# Patient Record
Sex: FEMALE | Race: WHITE | NOT HISPANIC OR LATINO
[De-identification: names, ages, dates, MRNs, and addresses within clinical notes are randomized per-mention and may not be internally consistent; named-entity substitution may affect disease eponyms.]

---

## 2017-04-14 ENCOUNTER — APPOINTMENT (OUTPATIENT)
Dept: OBGYN | Facility: CLINIC | Age: 57
End: 2017-04-14

## 2017-04-14 VITALS — DIASTOLIC BLOOD PRESSURE: 85 MMHG | SYSTOLIC BLOOD PRESSURE: 141 MMHG

## 2017-04-14 VITALS — HEIGHT: 64 IN | BODY MASS INDEX: 33.46 KG/M2 | WEIGHT: 196 LBS

## 2017-04-14 DIAGNOSIS — Z12.11 ENCOUNTER FOR SCREENING FOR MALIGNANT NEOPLASM OF COLON: ICD-10-CM

## 2017-04-14 DIAGNOSIS — Z12.12 ENCOUNTER FOR SCREENING FOR MALIGNANT NEOPLASM OF COLON: ICD-10-CM

## 2017-04-16 ENCOUNTER — MESSAGE (OUTPATIENT)
Age: 57
End: 2017-04-16

## 2017-04-16 LAB — HPV HIGH+LOW RISK DNA PNL CVX: NEGATIVE

## 2017-04-18 LAB — CYTOLOGY CVX/VAG DOC THIN PREP: NORMAL

## 2017-05-13 ENCOUNTER — LABORATORY RESULT (OUTPATIENT)
Age: 57
End: 2017-05-13

## 2017-05-19 ENCOUNTER — APPOINTMENT (OUTPATIENT)
Dept: ENDOCRINOLOGY | Facility: CLINIC | Age: 57
End: 2017-05-19

## 2017-05-19 VITALS
HEIGHT: 64 IN | SYSTOLIC BLOOD PRESSURE: 140 MMHG | BODY MASS INDEX: 33.97 KG/M2 | DIASTOLIC BLOOD PRESSURE: 92 MMHG | HEART RATE: 77 BPM | WEIGHT: 199 LBS | OXYGEN SATURATION: 98 %

## 2017-12-18 ENCOUNTER — APPOINTMENT (OUTPATIENT)
Dept: ENDOCRINOLOGY | Facility: CLINIC | Age: 57
End: 2017-12-18
Payer: COMMERCIAL

## 2017-12-18 VITALS — OXYGEN SATURATION: 97 % | DIASTOLIC BLOOD PRESSURE: 78 MMHG | SYSTOLIC BLOOD PRESSURE: 122 MMHG | HEART RATE: 82 BPM

## 2017-12-18 PROCEDURE — 99214 OFFICE O/P EST MOD 30 MIN: CPT

## 2017-12-19 LAB — PROLACTIN SERPL-MCNC: 114 NG/ML

## 2018-05-25 ENCOUNTER — APPOINTMENT (OUTPATIENT)
Dept: OBGYN | Facility: CLINIC | Age: 58
End: 2018-05-25
Payer: COMMERCIAL

## 2018-05-25 VITALS — DIASTOLIC BLOOD PRESSURE: 93 MMHG | SYSTOLIC BLOOD PRESSURE: 159 MMHG

## 2018-05-25 VITALS
DIASTOLIC BLOOD PRESSURE: 90 MMHG | BODY MASS INDEX: 38.24 KG/M2 | SYSTOLIC BLOOD PRESSURE: 162 MMHG | HEIGHT: 64 IN | WEIGHT: 224 LBS

## 2018-05-25 DIAGNOSIS — Z82.0 FAMILY HISTORY OF EPILEPSY AND OTHER DISEASES OF THE NERVOUS SYSTEM: ICD-10-CM

## 2018-05-25 PROCEDURE — 99396 PREV VISIT EST AGE 40-64: CPT

## 2018-05-25 RX ORDER — AZITHROMYCIN 250 MG/1
250 TABLET, FILM COATED ORAL
Qty: 6 | Refills: 0 | Status: COMPLETED | COMMUNITY
Start: 2017-12-22

## 2018-05-29 ENCOUNTER — APPOINTMENT (OUTPATIENT)
Dept: OBGYN | Facility: CLINIC | Age: 58
End: 2018-05-29

## 2018-05-29 LAB — HPV HIGH+LOW RISK DNA PNL CVX: NOT DETECTED

## 2018-06-01 LAB — CYTOLOGY CVX/VAG DOC THIN PREP: NORMAL

## 2018-06-05 ENCOUNTER — ASOB RESULT (OUTPATIENT)
Age: 58
End: 2018-06-05

## 2018-06-05 ENCOUNTER — APPOINTMENT (OUTPATIENT)
Dept: OBGYN | Facility: CLINIC | Age: 58
End: 2018-06-05
Payer: COMMERCIAL

## 2018-06-05 PROCEDURE — 76830 TRANSVAGINAL US NON-OB: CPT

## 2018-06-20 ENCOUNTER — RESULT REVIEW (OUTPATIENT)
Age: 58
End: 2018-06-20

## 2018-06-21 ENCOUNTER — OTHER (OUTPATIENT)
Age: 58
End: 2018-06-21

## 2018-08-03 ENCOUNTER — OUTPATIENT (OUTPATIENT)
Dept: OUTPATIENT SERVICES | Facility: HOSPITAL | Age: 58
LOS: 1 days | End: 2018-08-03
Payer: COMMERCIAL

## 2018-08-03 VITALS
TEMPERATURE: 97 F | DIASTOLIC BLOOD PRESSURE: 90 MMHG | RESPIRATION RATE: 16 BRPM | HEART RATE: 79 BPM | WEIGHT: 229.94 LBS | HEIGHT: 63 IN | SYSTOLIC BLOOD PRESSURE: 140 MMHG

## 2018-08-03 DIAGNOSIS — N94.9 UNSPECIFIED CONDITION ASSOCIATED WITH FEMALE GENITAL ORGANS AND MENSTRUAL CYCLE: ICD-10-CM

## 2018-08-03 DIAGNOSIS — R93.8 ABNORMAL FINDINGS ON DIAGNOSTIC IMAGING OF OTHER SPECIFIED BODY STRUCTURES: ICD-10-CM

## 2018-08-03 DIAGNOSIS — Z90.721 ACQUIRED ABSENCE OF OVARIES, UNILATERAL: Chronic | ICD-10-CM

## 2018-08-03 LAB
BLD GP AB SCN SERPL QL: NEGATIVE — SIGNIFICANT CHANGE UP
BUN SERPL-MCNC: 12 MG/DL — SIGNIFICANT CHANGE UP (ref 7–23)
CALCIUM SERPL-MCNC: 10 MG/DL — SIGNIFICANT CHANGE UP (ref 8.4–10.5)
CHLORIDE SERPL-SCNC: 103 MMOL/L — SIGNIFICANT CHANGE UP (ref 98–107)
CO2 SERPL-SCNC: 25 MMOL/L — SIGNIFICANT CHANGE UP (ref 22–31)
CREAT SERPL-MCNC: 0.89 MG/DL — SIGNIFICANT CHANGE UP (ref 0.5–1.3)
GLUCOSE SERPL-MCNC: 91 MG/DL — SIGNIFICANT CHANGE UP (ref 70–99)
HCT VFR BLD CALC: 40.2 % — SIGNIFICANT CHANGE UP (ref 34.5–45)
HGB BLD-MCNC: 12.5 G/DL — SIGNIFICANT CHANGE UP (ref 11.5–15.5)
MCHC RBC-ENTMCNC: 26.4 PG — LOW (ref 27–34)
MCHC RBC-ENTMCNC: 31.1 % — LOW (ref 32–36)
MCV RBC AUTO: 84.8 FL — SIGNIFICANT CHANGE UP (ref 80–100)
NRBC # FLD: 0 — SIGNIFICANT CHANGE UP
PLATELET # BLD AUTO: 328 K/UL — SIGNIFICANT CHANGE UP (ref 150–400)
PMV BLD: 10.9 FL — SIGNIFICANT CHANGE UP (ref 7–13)
POTASSIUM SERPL-MCNC: 4 MMOL/L — SIGNIFICANT CHANGE UP (ref 3.5–5.3)
POTASSIUM SERPL-SCNC: 4 MMOL/L — SIGNIFICANT CHANGE UP (ref 3.5–5.3)
RBC # BLD: 4.74 M/UL — SIGNIFICANT CHANGE UP (ref 3.8–5.2)
RBC # FLD: 13.9 % — SIGNIFICANT CHANGE UP (ref 10.3–14.5)
RH IG SCN BLD-IMP: POSITIVE — SIGNIFICANT CHANGE UP
SODIUM SERPL-SCNC: 140 MMOL/L — SIGNIFICANT CHANGE UP (ref 135–145)
WBC # BLD: 9.31 K/UL — SIGNIFICANT CHANGE UP (ref 3.8–10.5)
WBC # FLD AUTO: 9.31 K/UL — SIGNIFICANT CHANGE UP (ref 3.8–10.5)

## 2018-08-03 PROCEDURE — 93010 ELECTROCARDIOGRAM REPORT: CPT

## 2018-08-03 NOTE — H&P PST ADULT - VISION (WITH CORRECTIVE LENSES IF THE PATIENT USUALLY WEARS THEM):
Partially impaired: cannot see medication labels or newsprint, but can see obstacles in path, and the surrounding layout; can count fingers at arm's length/glasses for reading only

## 2018-08-03 NOTE — H&P PST ADULT - PMH
GERD (gastroesophageal reflux disease)    Obesity    Osteoporosis    Ovarian cyst, left    Pituitary tumor    Polyp of corpus uteri GERD (gastroesophageal reflux disease)    Morbidly obese    Osteoporosis    Ovarian cyst, left    Pituitary tumor    Polyp of corpus uteri

## 2018-08-03 NOTE — H&P PST ADULT - NSANTHOSAYNRD_GEN_A_CORE
No. DAREN screening performed.  STOP BANG Legend: 0-2 = LOW Risk; 3-4 = INTERMEDIATE Risk; 5-8 = HIGH Risk

## 2018-08-13 ENCOUNTER — TRANSCRIPTION ENCOUNTER (OUTPATIENT)
Age: 58
End: 2018-08-13

## 2018-08-14 ENCOUNTER — RESULT REVIEW (OUTPATIENT)
Age: 58
End: 2018-08-14

## 2018-08-14 ENCOUNTER — OUTPATIENT (OUTPATIENT)
Dept: OUTPATIENT SERVICES | Facility: HOSPITAL | Age: 58
LOS: 1 days | Discharge: HOME | End: 2018-08-14
Payer: COMMERCIAL

## 2018-08-14 ENCOUNTER — APPOINTMENT (OUTPATIENT)
Dept: OBGYN | Facility: HOSPITAL | Age: 58
End: 2018-08-14

## 2018-08-14 VITALS
DIASTOLIC BLOOD PRESSURE: 91 MMHG | HEART RATE: 76 BPM | OXYGEN SATURATION: 97 % | TEMPERATURE: 98 F | RESPIRATION RATE: 16 BRPM | SYSTOLIC BLOOD PRESSURE: 151 MMHG

## 2018-08-14 VITALS
TEMPERATURE: 97 F | SYSTOLIC BLOOD PRESSURE: 140 MMHG | DIASTOLIC BLOOD PRESSURE: 90 MMHG | HEART RATE: 79 BPM | RESPIRATION RATE: 16 BRPM | WEIGHT: 229.94 LBS | HEIGHT: 63 IN

## 2018-08-14 DIAGNOSIS — Z90.721 ACQUIRED ABSENCE OF OVARIES, UNILATERAL: Chronic | ICD-10-CM

## 2018-08-14 DIAGNOSIS — R93.8 ABNORMAL FINDINGS ON DIAGNOSTIC IMAGING OF OTHER SPECIFIED BODY STRUCTURES: ICD-10-CM

## 2018-08-14 PROBLEM — M81.0 AGE-RELATED OSTEOPOROSIS WITHOUT CURRENT PATHOLOGICAL FRACTURE: Chronic | Status: ACTIVE | Noted: 2018-08-03

## 2018-08-14 PROCEDURE — 88305 TISSUE EXAM BY PATHOLOGIST: CPT | Mod: 26

## 2018-08-14 PROCEDURE — 58558 HYSTEROSCOPY BIOPSY: CPT

## 2018-08-14 RX ORDER — CHOLECALCIFEROL (VITAMIN D3) 125 MCG
1 CAPSULE ORAL
Qty: 0 | Refills: 0 | COMMUNITY

## 2018-08-14 RX ORDER — OMEPRAZOLE 10 MG/1
1 CAPSULE, DELAYED RELEASE ORAL
Qty: 0 | Refills: 0 | COMMUNITY

## 2018-08-14 RX ORDER — ONDANSETRON 8 MG/1
4 TABLET, FILM COATED ORAL EVERY 6 HOURS
Qty: 0 | Refills: 0 | Status: DISCONTINUED | OUTPATIENT
Start: 2018-08-14 | End: 2018-08-29

## 2018-08-14 RX ORDER — FENTANYL CITRATE 50 UG/ML
50 INJECTION INTRAVENOUS
Qty: 0 | Refills: 0 | Status: DISCONTINUED | OUTPATIENT
Start: 2018-08-14 | End: 2018-08-14

## 2018-08-14 RX ORDER — FENTANYL CITRATE 50 UG/ML
25 INJECTION INTRAVENOUS
Qty: 0 | Refills: 0 | Status: DISCONTINUED | OUTPATIENT
Start: 2018-08-14 | End: 2018-08-14

## 2018-08-14 RX ORDER — SODIUM CHLORIDE 9 MG/ML
1000 INJECTION, SOLUTION INTRAVENOUS
Qty: 0 | Refills: 0 | Status: DISCONTINUED | OUTPATIENT
Start: 2018-08-14 | End: 2018-08-29

## 2018-08-14 NOTE — BRIEF OPERATIVE NOTE - PROCEDURE
<<-----Click on this checkbox to enter Procedure Hysteroscopy with dilation and curettage of uterus  08/14/2018    Active  JUNL

## 2018-08-14 NOTE — ASU DISCHARGE PLAN (ADULT/PEDIATRIC). - ACTIVITY LEVEL
for 2 weeks/no tampons/nothing per rectum/no weight bearing/no tub baths/no douching/no intercourse/nothing per vagina/no heavy lifting

## 2018-08-14 NOTE — ASU PATIENT PROFILE, ADULT - PMH
GERD (gastroesophageal reflux disease)    Morbidly obese    Osteoporosis    Ovarian cyst, left    Pituitary tumor    Polyp of corpus uteri

## 2018-08-14 NOTE — ASU DISCHARGE PLAN (ADULT/PEDIATRIC). - MEDICATION SUMMARY - MEDICATIONS TO TAKE
I will START or STAY ON the medications listed below when I get home from the hospital:    Tylenol 325 mg oral tablet  -- 2 tab(s) by mouth every 4 hours, As Needed  -- Indication: For pain/cramping    Motrin 600 mg oral tablet  -- 1 tab(s) by mouth every 6 hours, As Needed  -- Indication: For pain/cramping

## 2018-08-14 NOTE — BRIEF OPERATIVE NOTE - OPERATION/FINDINGS
Anterverted uterus noted on exam. Unremarkable uterine cavity on hysteroscopy. No polyps or lesions noted.

## 2018-08-14 NOTE — ASU DISCHARGE PLAN (ADULT/PEDIATRIC). - NURSING INSTRUCTIONS
Nothing in vagina, no intercourse, no douching, no tampons, no tub baths, and no swimming for 2 weeks or until cleared by M.D.

## 2018-08-22 ENCOUNTER — RESULT REVIEW (OUTPATIENT)
Age: 58
End: 2018-08-22

## 2018-09-06 ENCOUNTER — APPOINTMENT (OUTPATIENT)
Dept: OBGYN | Facility: CLINIC | Age: 58
End: 2018-09-06
Payer: COMMERCIAL

## 2018-09-06 VITALS — DIASTOLIC BLOOD PRESSURE: 97 MMHG | SYSTOLIC BLOOD PRESSURE: 167 MMHG

## 2018-09-06 PROCEDURE — 99213 OFFICE O/P EST LOW 20 MIN: CPT

## 2018-12-18 ENCOUNTER — APPOINTMENT (OUTPATIENT)
Dept: ENDOCRINOLOGY | Facility: CLINIC | Age: 58
End: 2018-12-18
Payer: COMMERCIAL

## 2018-12-18 VITALS
WEIGHT: 240 LBS | BODY MASS INDEX: 40.97 KG/M2 | HEIGHT: 64 IN | OXYGEN SATURATION: 98 % | SYSTOLIC BLOOD PRESSURE: 150 MMHG | DIASTOLIC BLOOD PRESSURE: 90 MMHG | HEART RATE: 80 BPM

## 2018-12-18 DIAGNOSIS — D35.2 BENIGN NEOPLASM OF PITUITARY GLAND: ICD-10-CM

## 2018-12-18 PROCEDURE — 99214 OFFICE O/P EST MOD 30 MIN: CPT

## 2018-12-18 RX ORDER — TOCOPHERSOLAN (VITAMIN E TPGS) 400/15ML
LIQUID (ML) ORAL
Refills: 0 | Status: ACTIVE | COMMUNITY

## 2018-12-22 ENCOUNTER — LABORATORY RESULT (OUTPATIENT)
Age: 58
End: 2018-12-22

## 2018-12-26 LAB
ALBUMIN SERPL ELPH-MCNC: 4.3 G/DL
ALP BLD-CCNC: 95 U/L
ALT SERPL-CCNC: 22 U/L
ANION GAP SERPL CALC-SCNC: 11 MMOL/L
AST SERPL-CCNC: 21 U/L
BILIRUB SERPL-MCNC: 0.5 MG/DL
BUN SERPL-MCNC: 11 MG/DL
CALCIUM SERPL-MCNC: 10 MG/DL
CHLORIDE SERPL-SCNC: 105 MMOL/L
CO2 SERPL-SCNC: 24 MMOL/L
CREAT SERPL-MCNC: 0.87 MG/DL
FSH SERPL-MCNC: 3.1 IU/L
GLUCOSE SERPL-MCNC: 103 MG/DL
IGF-1 INTERP: NORMAL
IGF-I BLD-MCNC: 165 NG/ML
LH SERPL-ACNC: 0.6 IU/L
POTASSIUM SERPL-SCNC: 4.2 MMOL/L
PROLACTIN SERPL-MCNC: 104.5 NG/ML
PROT SERPL-MCNC: 7.7 G/DL
SODIUM SERPL-SCNC: 140 MMOL/L
T3RU NFR SERPL: 1.16 INDEX
T4 SERPL-MCNC: 6.4 UG/DL
TSH SERPL-ACNC: 1.74 UIU/ML

## 2018-12-29 LAB
MONOMERIC PROLACTIN (ICMA)*: 94 NG/ML
PERCENT MACROPROLACTIN: 5 %
PROLACTIN, SERUM (ICMA)*: 99 NG/ML

## 2019-05-31 ENCOUNTER — APPOINTMENT (OUTPATIENT)
Dept: OBGYN | Facility: CLINIC | Age: 59
End: 2019-05-31
Payer: COMMERCIAL

## 2019-05-31 VITALS
BODY MASS INDEX: 38.41 KG/M2 | DIASTOLIC BLOOD PRESSURE: 86 MMHG | HEIGHT: 64 IN | WEIGHT: 225 LBS | SYSTOLIC BLOOD PRESSURE: 138 MMHG

## 2019-05-31 DIAGNOSIS — Z09 ENCOUNTER FOR FOLLOW-UP EXAMINATION AFTER COMPLETED TREATMENT FOR CONDITIONS OTHER THAN MALIGNANT NEOPLASM: ICD-10-CM

## 2019-05-31 PROCEDURE — 99396 PREV VISIT EST AGE 40-64: CPT

## 2019-05-31 NOTE — CHIEF COMPLAINT
[Annual Visit] : annual visit [FreeTextEntry1] : Pt states earlier in the week she had some cramping and htne noticed some blood when she wiped.  Is better now - started Monday.  Pt is menopausal - last period was 6-7 years ago.  Had D&C last August.

## 2019-06-06 LAB
CYTOLOGY CVX/VAG DOC THIN PREP: NORMAL
HPV HIGH+LOW RISK DNA PNL CVX: NOT DETECTED

## 2019-06-11 ENCOUNTER — ASOB RESULT (OUTPATIENT)
Age: 59
End: 2019-06-11

## 2019-06-11 ENCOUNTER — APPOINTMENT (OUTPATIENT)
Dept: OBGYN | Facility: CLINIC | Age: 59
End: 2019-06-11
Payer: COMMERCIAL

## 2019-06-11 PROCEDURE — 76830 TRANSVAGINAL US NON-OB: CPT

## 2019-07-11 ENCOUNTER — TRANSCRIPTION ENCOUNTER (OUTPATIENT)
Age: 59
End: 2019-07-11

## 2019-07-12 ENCOUNTER — APPOINTMENT (OUTPATIENT)
Dept: OBGYN | Facility: CLINIC | Age: 59
End: 2019-07-12
Payer: COMMERCIAL

## 2019-07-12 VITALS
HEIGHT: 64 IN | DIASTOLIC BLOOD PRESSURE: 84 MMHG | WEIGHT: 225 LBS | BODY MASS INDEX: 38.41 KG/M2 | SYSTOLIC BLOOD PRESSURE: 147 MMHG

## 2019-07-12 DIAGNOSIS — N95.0 POSTMENOPAUSAL BLEEDING: ICD-10-CM

## 2019-07-12 PROCEDURE — 58100 BIOPSY OF UTERUS LINING: CPT

## 2019-07-12 NOTE — PROCEDURE
[Endometrial Biopsy] : Endometrial biopsy [Post-Menop. Bleeding] : post-menopausal bleeding [CONSENT OBTAINED] : written consent was obtained prior to the procedure. [Betadine] : Betadine [Ibuprofen ___ mg] : ibuprofen [unfilled] ~Umg [None] : none [Easy Passage] : allowed easy passage of a uterine sound without dilation [Sounded to ___ cm] : sounded to [unfilled] ~Ucm [Pipelle] : a Pipelle endometrial suction curette [Moderate] : a moderate [Tolerated Well] : the patient tolerated the procedure well [No Complications] : there were no complications

## 2019-07-18 ENCOUNTER — OTHER (OUTPATIENT)
Age: 59
End: 2019-07-18

## 2019-07-19 LAB — CORE LAB BIOPSY: NORMAL

## 2019-08-02 ENCOUNTER — APPOINTMENT (OUTPATIENT)
Dept: OBGYN | Facility: CLINIC | Age: 59
End: 2019-08-02
Payer: COMMERCIAL

## 2019-08-02 VITALS
HEIGHT: 64 IN | WEIGHT: 225 LBS | SYSTOLIC BLOOD PRESSURE: 159 MMHG | BODY MASS INDEX: 38.41 KG/M2 | DIASTOLIC BLOOD PRESSURE: 100 MMHG

## 2019-08-02 PROCEDURE — 99215 OFFICE O/P EST HI 40 MIN: CPT

## 2019-08-02 NOTE — CHIEF COMPLAINT
[FreeTextEntry1] : Pt presents with her sister to discuss results.  Pt with complex hyperplasia without atypia on EMB in office done for episode of PMB.  Called pt to recommend D&C hysteroscopy for futher evaluation of the endometrium and insertion of mirena IUD and pt wanted to discuss in further detail.  Pt worried about pelvic cramping with the IUD.

## 2019-10-04 ENCOUNTER — OUTPATIENT (OUTPATIENT)
Dept: OUTPATIENT SERVICES | Facility: HOSPITAL | Age: 59
LOS: 1 days | End: 2019-10-04
Payer: COMMERCIAL

## 2019-10-04 VITALS
HEART RATE: 76 BPM | OXYGEN SATURATION: 98 % | HEIGHT: 63 IN | TEMPERATURE: 97 F | WEIGHT: 214.95 LBS | SYSTOLIC BLOOD PRESSURE: 140 MMHG | RESPIRATION RATE: 16 BRPM | DIASTOLIC BLOOD PRESSURE: 90 MMHG

## 2019-10-04 DIAGNOSIS — N85.01 BENIGN ENDOMETRIAL HYPERPLASIA: ICD-10-CM

## 2019-10-04 DIAGNOSIS — Z90.721 ACQUIRED ABSENCE OF OVARIES, UNILATERAL: Chronic | ICD-10-CM

## 2019-10-04 DIAGNOSIS — Z98.890 OTHER SPECIFIED POSTPROCEDURAL STATES: Chronic | ICD-10-CM

## 2019-10-04 LAB
ANION GAP SERPL CALC-SCNC: 12 MMO/L — SIGNIFICANT CHANGE UP (ref 7–14)
BUN SERPL-MCNC: 15 MG/DL — SIGNIFICANT CHANGE UP (ref 7–23)
CALCIUM SERPL-MCNC: 11.1 MG/DL — HIGH (ref 8.4–10.5)
CHLORIDE SERPL-SCNC: 102 MMOL/L — SIGNIFICANT CHANGE UP (ref 98–107)
CO2 SERPL-SCNC: 28 MMOL/L — SIGNIFICANT CHANGE UP (ref 22–31)
CREAT SERPL-MCNC: 0.95 MG/DL — SIGNIFICANT CHANGE UP (ref 0.5–1.3)
GLUCOSE SERPL-MCNC: 64 MG/DL — LOW (ref 70–99)
HCT VFR BLD CALC: 43.3 % — SIGNIFICANT CHANGE UP (ref 34.5–45)
HGB BLD-MCNC: 13.1 G/DL — SIGNIFICANT CHANGE UP (ref 11.5–15.5)
MCHC RBC-ENTMCNC: 25.8 PG — LOW (ref 27–34)
MCHC RBC-ENTMCNC: 30.3 % — LOW (ref 32–36)
MCV RBC AUTO: 85.4 FL — SIGNIFICANT CHANGE UP (ref 80–100)
NRBC # FLD: 0 K/UL — SIGNIFICANT CHANGE UP (ref 0–0)
PLATELET # BLD AUTO: 358 K/UL — SIGNIFICANT CHANGE UP (ref 150–400)
PMV BLD: 11 FL — SIGNIFICANT CHANGE UP (ref 7–13)
POTASSIUM SERPL-MCNC: 4.4 MMOL/L — SIGNIFICANT CHANGE UP (ref 3.5–5.3)
POTASSIUM SERPL-SCNC: 4.4 MMOL/L — SIGNIFICANT CHANGE UP (ref 3.5–5.3)
RBC # BLD: 5.07 M/UL — SIGNIFICANT CHANGE UP (ref 3.8–5.2)
RBC # FLD: 14.2 % — SIGNIFICANT CHANGE UP (ref 10.3–14.5)
SODIUM SERPL-SCNC: 142 MMOL/L — SIGNIFICANT CHANGE UP (ref 135–145)
WBC # BLD: 10.34 K/UL — SIGNIFICANT CHANGE UP (ref 3.8–10.5)
WBC # FLD AUTO: 10.34 K/UL — SIGNIFICANT CHANGE UP (ref 3.8–10.5)

## 2019-10-04 PROCEDURE — 93010 ELECTROCARDIOGRAM REPORT: CPT

## 2019-10-04 RX ORDER — SODIUM CHLORIDE 9 MG/ML
1000 INJECTION, SOLUTION INTRAVENOUS
Refills: 0 | Status: DISCONTINUED | OUTPATIENT
Start: 2019-10-21 | End: 2019-11-09

## 2019-10-04 RX ORDER — ACETAMINOPHEN 500 MG
2 TABLET ORAL
Qty: 0 | Refills: 0 | DISCHARGE

## 2019-10-04 RX ORDER — IBUPROFEN 200 MG
1 TABLET ORAL
Qty: 0 | Refills: 0 | DISCHARGE

## 2019-10-04 NOTE — H&P PST ADULT - NSICDXPROBLEM_GEN_ALL_CORE_FT
benign endometrial hyperplasia:   Scheduled for D&C operative hysteroscopy, possible symphion and Mirena IUD insertion on 10/21/19.  preop instructions given  gi prophylaxis - pt instructed to take own.  pt verbalized understanding    Hypertension:  continue medication per routine schedule  DAREN precautions recommended.  OR booking notified via fax.

## 2019-10-04 NOTE — H&P PST ADULT - PT NEEDS ASSIST
Kaitlynn stated patient admitted to Palliative Care today December 20, 2018.  Requesting PT< OT and social work.      Please call Kaitlynn   no

## 2019-10-04 NOTE — H&P PST ADULT - PRESSURE ULCER(S)
CREST 2     Left message on subject's sister's voicemail to please have subject return my call to schedule 1 month CREST 2 follow-up. I have made several attempts to contact subject and left messages.   
no

## 2019-10-04 NOTE — H&P PST ADULT - NSICDXFAMILYHX_GEN_ALL_CORE_FT
FAMILY HISTORY:  Family history of thyroid cancer, father    Mother  Still living? No  Family history of Alzheimer's disease, Age at diagnosis: Age Unknown

## 2019-10-04 NOTE — H&P PST ADULT - VISION (WITH CORRECTIVE LENSES IF THE PATIENT USUALLY WEARS THEM):
glasses for reading only/Partially impaired: cannot see medication labels or newsprint, but can see obstacles in path, and the surrounding layout; can count fingers at arm's length

## 2019-10-04 NOTE — H&P PST ADULT - NEGATIVE CARDIOVASCULAR SYMPTOMS
no paroxysmal nocturnal dyspnea/no claudication/no orthopnea/no peripheral edema/no palpitations/no chest pain/no dyspnea on exertion

## 2019-10-04 NOTE — H&P PST ADULT - NSICDXPASTMEDICALHX_GEN_ALL_CORE_FT
PAST MEDICAL HISTORY:  Benign endometrial hyperplasia     GERD (gastroesophageal reflux disease)     Hypertension     Morbidly obese     Osteoporosis     Ovarian cyst, left     Pituitary tumor     Polyp of corpus uteri PAST MEDICAL HISTORY:  Benign endometrial hyperplasia     GERD (gastroesophageal reflux disease)     Hypertension     Obesity     Osteoporosis     Ovarian cyst, left     Pituitary tumor h/o last MRI >4yrs ago    Polyp of corpus uteri

## 2019-10-04 NOTE — H&P PST ADULT - NSICDXPASTSURGICALHX_GEN_ALL_CORE_FT
PAST SURGICAL HISTORY:  History of left oophorectomy 2013 PAST SURGICAL HISTORY:  History of left oophorectomy 2013    S/P D&C (status post dilation and curettage) 8/2019

## 2019-10-04 NOTE — H&P PST ADULT - HISTORY OF PRESENT ILLNESS
Pt. is a 57 yo female that had a transvaginal sonogram that revealed endometrial hypertrophy. 60 yo female that had a transvaginal sonogram that revealed thickening of uterine lining.    Scheduled for 58 yo female that had a transvaginal sonogram that revealed thickening of uterine lining.    Scheduled for D&C operative hysteroscopy, possible symphion and Mirena IUD insertion on 10/21/19.

## 2019-10-18 NOTE — ASU PATIENT PROFILE, ADULT - PMH
Benign endometrial hyperplasia    GERD (gastroesophageal reflux disease)    Hypertension    Obesity    Osteoporosis    Ovarian cyst, left    Pituitary tumor  h/o last MRI >4yrs ago  Polyp of corpus uteri

## 2019-10-20 ENCOUNTER — TRANSCRIPTION ENCOUNTER (OUTPATIENT)
Age: 59
End: 2019-10-20

## 2019-10-21 ENCOUNTER — RESULT REVIEW (OUTPATIENT)
Age: 59
End: 2019-10-21

## 2019-10-21 ENCOUNTER — OUTPATIENT (OUTPATIENT)
Dept: OUTPATIENT SERVICES | Facility: HOSPITAL | Age: 59
LOS: 1 days | Discharge: HOME | End: 2019-10-21
Payer: COMMERCIAL

## 2019-10-21 ENCOUNTER — APPOINTMENT (OUTPATIENT)
Dept: OBGYN | Facility: HOSPITAL | Age: 59
End: 2019-10-21

## 2019-10-21 VITALS
OXYGEN SATURATION: 98 % | RESPIRATION RATE: 18 BRPM | DIASTOLIC BLOOD PRESSURE: 77 MMHG | HEART RATE: 85 BPM | SYSTOLIC BLOOD PRESSURE: 124 MMHG

## 2019-10-21 VITALS
RESPIRATION RATE: 18 BRPM | DIASTOLIC BLOOD PRESSURE: 86 MMHG | WEIGHT: 214.95 LBS | HEART RATE: 82 BPM | HEIGHT: 63 IN | SYSTOLIC BLOOD PRESSURE: 144 MMHG | OXYGEN SATURATION: 97 % | TEMPERATURE: 98 F

## 2019-10-21 DIAGNOSIS — Z90.721 ACQUIRED ABSENCE OF OVARIES, UNILATERAL: Chronic | ICD-10-CM

## 2019-10-21 DIAGNOSIS — N85.01 BENIGN ENDOMETRIAL HYPERPLASIA: ICD-10-CM

## 2019-10-21 DIAGNOSIS — Z98.890 OTHER SPECIFIED POSTPROCEDURAL STATES: Chronic | ICD-10-CM

## 2019-10-21 PROCEDURE — 58300 INSERT INTRAUTERINE DEVICE: CPT

## 2019-10-21 PROCEDURE — 58558 HYSTEROSCOPY BIOPSY: CPT

## 2019-10-21 PROCEDURE — 88305 TISSUE EXAM BY PATHOLOGIST: CPT | Mod: 26

## 2019-10-21 RX ORDER — SODIUM CHLORIDE 9 MG/ML
1000 INJECTION, SOLUTION INTRAVENOUS
Refills: 0 | Status: DISCONTINUED | OUTPATIENT
Start: 2019-10-21 | End: 2019-11-09

## 2019-10-21 RX ADMIN — SODIUM CHLORIDE 30 MILLILITER(S): 9 INJECTION, SOLUTION INTRAVENOUS at 11:05

## 2019-10-21 NOTE — ASU DISCHARGE PLAN (ADULT/PEDIATRIC) - CALL YOUR DOCTOR IF YOU HAVE ANY OF THE FOLLOWING:
Fever greater than (need to indicate Fahrenheit or Celsius)/Wound/Surgical Site with redness, or foul smelling discharge or pus/Nausea and vomiting that does not stop/Pain not relieved by Medications/Unable to urinate/Inability to tolerate liquids or foods/Bleeding that does not stop

## 2019-10-21 NOTE — ASU DISCHARGE PLAN (ADULT/PEDIATRIC) - ASU DC SPECIAL INSTRUCTIONSFT
You received Tylenol on 10/21/19 at 2:00 PM. You CANNOT take any medications with Acetaminophen until 8:00 PM on 10/21/19. You received Toradol on 10/21/19 at 2:15 PM. You CANNOT take any medications with Ibuprofen until 10/21/19 at 8:15 PM.

## 2019-10-21 NOTE — BRIEF OPERATIVE NOTE - OPERATION/FINDINGS
Cervix within normal limits. Uterine cavity normal. No lesions, fibroids or polyps noted. Ostia normal bilaterally.

## 2019-10-21 NOTE — ASU DISCHARGE PLAN (ADULT/PEDIATRIC) - NURSING INSTRUCTIONS
You received IV Tylenol for pain management at _2pm __. Please DO NOT take any Tylenol (Acetaminophen) containing products, such as Vicodin, Percocet, Excedrin, and cold medications for the next 6 hours (until __8_ PM). DO NOT TAKE MORE THAN 3000 MG OF TYLENOL in a 24 hour period.    You received IV Toradol for pain management at _2pm __. Please DO NOT take Motrin/Ibuprofen/Advil/Aleve/NSAIDs (Non-Steroidal Anti-Inflammatory Drugs) for the next 6 hours (until __8_ PM).

## 2019-10-21 NOTE — ASU DISCHARGE PLAN (ADULT/PEDIATRIC) - DISCHARGE PLAN IS COMPLETE AND GIVEN TO PATIENT
----- Message from Kaykay Boateng MA sent at 8/1/2019  3:23 PM CDT -----  Contact: jenni  [facility] home health   Seen yesterday complaining pain in fore arm   Swelling,  Fell last week hitting her hand on bedside table   Next available appt 08/08   Call patient  For appt ()      Jenni call back     : Yes

## 2019-10-21 NOTE — ASU DISCHARGE PLAN (ADULT/PEDIATRIC) - CARE PROVIDER_API CALL
Maye Booker)  Obstetrics and Gynecology  [facility] [place] [address]  Phone: [phone]  Fax: [phone]  Follow Up Time:

## 2019-10-21 NOTE — BRIEF OPERATIVE NOTE - NSICDXBRIEFPROCEDURE_GEN_ALL_CORE_FT
PROCEDURES:  Insertion of Mirena IUD 21-Oct-2019 14:34:35  Lory Pelayo  Dilation and curettage, uterus 21-Oct-2019 14:34:25  Lory Pelayo PROCEDURES:  Operative hysteroscopy with fluid management system 21-Oct-2019 19:50:33  Lory Pelayo  Insertion of Mirena IUD 21-Oct-2019 14:34:35  Lory Pelayo  Dilation and curettage, uterus 21-Oct-2019 14:34:25  Lory Pelayo

## 2019-10-22 PROBLEM — N85.01 BENIGN ENDOMETRIAL HYPERPLASIA: Chronic | Status: ACTIVE | Noted: 2019-10-04

## 2019-10-22 PROBLEM — E66.9 OBESITY, UNSPECIFIED: Chronic | Status: ACTIVE | Noted: 2019-10-04

## 2019-10-22 PROBLEM — I10 ESSENTIAL (PRIMARY) HYPERTENSION: Chronic | Status: ACTIVE | Noted: 2019-10-04

## 2019-10-23 LAB — SURGICAL PATHOLOGY STUDY: SIGNIFICANT CHANGE UP

## 2019-10-28 ENCOUNTER — RESULT REVIEW (OUTPATIENT)
Age: 59
End: 2019-10-28

## 2019-11-04 ENCOUNTER — ASOB RESULT (OUTPATIENT)
Age: 59
End: 2019-11-04

## 2019-11-04 ENCOUNTER — APPOINTMENT (OUTPATIENT)
Dept: OBGYN | Facility: CLINIC | Age: 59
End: 2019-11-04
Payer: COMMERCIAL

## 2019-11-04 VITALS — SYSTOLIC BLOOD PRESSURE: 143 MMHG | DIASTOLIC BLOOD PRESSURE: 85 MMHG

## 2019-11-04 PROCEDURE — 99213 OFFICE O/P EST LOW 20 MIN: CPT

## 2019-11-04 PROCEDURE — 76830 TRANSVAGINAL US NON-OB: CPT

## 2019-11-04 NOTE — CHIEF COMPLAINT
[Post-Op Visit] : post-operative visit [FreeTextEntry1] : S/p d&c, hysteroscopy, mirena inerstion for simple hyperplasia without atypia on EMB in office \par C/o slight bleeding\par no pain, fever, chills \par c/o increased gas pain and flatulence but might be resolved

## 2019-12-18 ENCOUNTER — LABORATORY RESULT (OUTPATIENT)
Age: 59
End: 2019-12-18

## 2019-12-18 ENCOUNTER — APPOINTMENT (OUTPATIENT)
Dept: ENDOCRINOLOGY | Facility: CLINIC | Age: 59
End: 2019-12-18
Payer: COMMERCIAL

## 2019-12-18 VITALS
WEIGHT: 219 LBS | BODY MASS INDEX: 37.39 KG/M2 | DIASTOLIC BLOOD PRESSURE: 90 MMHG | HEIGHT: 64 IN | HEART RATE: 89 BPM | OXYGEN SATURATION: 98 % | SYSTOLIC BLOOD PRESSURE: 142 MMHG

## 2019-12-18 PROCEDURE — 99214 OFFICE O/P EST MOD 30 MIN: CPT

## 2019-12-18 RX ORDER — AMLODIPINE BESYLATE 10 MG/1
10 TABLET ORAL
Refills: 0 | Status: ACTIVE | COMMUNITY

## 2019-12-18 NOTE — REVIEW OF SYSTEMS
[Recent Weight Gain (___ Lbs)] : recent [unfilled] ~Ulb weight gain [Recent Weight Loss (___ Lbs)] : recent [unfilled] ~Ulb weight loss [Negative] : Heme/Lymph

## 2019-12-19 LAB
ALBUMIN SERPL ELPH-MCNC: 4.2 G/DL
ALP BLD-CCNC: 105 U/L
ALT SERPL-CCNC: 8 U/L
ANION GAP SERPL CALC-SCNC: 10 MMOL/L
AST SERPL-CCNC: 14 U/L
BILIRUB SERPL-MCNC: 0.3 MG/DL
BUN SERPL-MCNC: 14 MG/DL
CALCIUM SERPL-MCNC: 10.1 MG/DL
CHLORIDE SERPL-SCNC: 104 MMOL/L
CO2 SERPL-SCNC: 25 MMOL/L
CREAT SERPL-MCNC: 0.77 MG/DL
ESTRADIOL SERPL-MCNC: 18 PG/ML
FSH SERPL-MCNC: 2 IU/L
GLUCOSE SERPL-MCNC: 103 MG/DL
IGF-1 INTERP: NORMAL
IGF-I BLD-MCNC: 205 NG/ML
LH SERPL-ACNC: <0.3 IU/L
POTASSIUM SERPL-SCNC: 4.2 MMOL/L
PROLACTIN SERPL-MCNC: 75.7 NG/ML
PROT SERPL-MCNC: 7.3 G/DL
SODIUM SERPL-SCNC: 139 MMOL/L
T3RU NFR SERPL: 1.1 TBI
T4 SERPL-MCNC: 6.3 UG/DL
TSH SERPL-ACNC: 2.05 UIU/ML

## 2019-12-19 NOTE — ASSESSMENT
[FreeTextEntry1] : 58 y/o female with long h/o hyperprolactinemia.  \par \par Pt off therapy since early 2014. Prior prolactin elevated at 128, but pt remained asymptomatic. Repeat MRI Sept 2015 stable, no clear adenoma. No evidence of other pituitary disease. Pt remains asymptomatic. No galactorrhea, HA, or change in vision.\par \par BMD 2018 indicates osteopenia. Recommended no more than calcium 500 mg per day, vitamin D. 2000 units per day, in addition to dietary intake. No additional osteoporosis rx recommended at this time. \par \par Request labs sent out. Request pt get repeat MRI to examine pituitary.\par \par f/u in 1 year

## 2019-12-19 NOTE — PHYSICAL EXAM
[No Acute Distress] : no acute distress [Alert] : alert [Well Nourished] : well nourished [No Proptosis] : no proptosis [Normal Sclera/Conjunctiva] : normal sclera/conjunctiva [Well Developed] : well developed [Thyroid Not Enlarged] : the thyroid was not enlarged [Normal Oropharynx] : the oropharynx was normal [Visual Fields Grossly Intact] : visual fields grossly intact [No Thyroid Nodules] : there were no palpable thyroid nodules [No Respiratory Distress] : no respiratory distress [No Accessory Muscle Use] : no accessory muscle use [Clear to Auscultation] : lungs were clear to auscultation bilaterally [Normal Rate] : heart rate was normal  [Normal S1, S2] : normal S1 and S2 [Regular Rhythm] : with a regular rhythm [Normal Bowel Sounds] : normal bowel sounds [Not Tender] : non-tender [Soft] : abdomen soft [Not Distended] : not distended [Anterior Cervical Nodes] : anterior cervical nodes [Normal] : normal and non tender [No Spinal Tenderness] : no spinal tenderness [Spine Straight] : spine straight [No Stigmata of Cushings Syndrome] : no stigmata of cushings syndrome [Normal Gait] : normal gait [Normal Strength/Tone] : muscle strength and tone were normal [No Rash] : no rash [Normal Reflexes] : deep tendon reflexes were 2+ and symmetric [No Tremors] : no tremors [Oriented x3] : oriented to person, place, and time [No Galactorrhea] : no galactorrhea [Hirsutism] : no hirsutism [de-identified] : no acrogemgalic signs

## 2019-12-19 NOTE — HISTORY OF PRESENT ILLNESS
[FreeTextEntry1] : f/u 58 y/o female with hyperprolactinemia. \par \par Previously tried stopping cabergoline 2009, prolactin shaq to 150. Pt was then on rx for many years. Stopped cabergoline Feb 2014. No galactorrhea, or change in visual fields, HA. MRI Sept 2015 1 cm pit, not read as adenoma, stable. \par Sees ophtho every January. 2016 had formal visual field tests. Prolactin: 128 stable. LH/FSH low.\par D and C 2018 for thickening to endometrial lining, neg.\par  \par LMP June 2013 no menopausal hot flashes. Had L oophorectomy for large cyst Sept 2013.\par Up to date with mammography and GYN May 2019.\par \par Pt had BMD at [facility] June 2018 reported as osteoporosis. Images reviewed. Spine: L1 and L4 -2.2 (L3 reported as -3.0) tot hip: -1.0 fem neck:L: -1.9 fem neck: R:-1.4. Pt is taking Ca and Vit D. I do not believe this is true osteoporosis. \par \par Pt had Mirena IUD placed in 10/2019 due to endometrial hyperplasia.

## 2019-12-19 NOTE — END OF VISIT
[FreeTextEntry3] : I, Amos Polanco, authored this note working as a medical scribe for Dr. Arrington.  12/18/2019. 10:15AM. This note was authored by the medical scribe for me. I have reviewed, edited, and revised the note as needed. I am in agreement with the exam findings, imaging findings, and treatment plan.  Siddharth Arrington MD

## 2020-01-16 ENCOUNTER — APPOINTMENT (OUTPATIENT)
Dept: ULTRASOUND IMAGING | Facility: CLINIC | Age: 60
End: 2020-01-16
Payer: COMMERCIAL

## 2020-01-16 ENCOUNTER — OUTPATIENT (OUTPATIENT)
Dept: OUTPATIENT SERVICES | Facility: HOSPITAL | Age: 60
LOS: 1 days | End: 2020-01-16
Payer: COMMERCIAL

## 2020-01-16 DIAGNOSIS — Z98.890 OTHER SPECIFIED POSTPROCEDURAL STATES: Chronic | ICD-10-CM

## 2020-01-16 DIAGNOSIS — Z90.721 ACQUIRED ABSENCE OF OVARIES, UNILATERAL: Chronic | ICD-10-CM

## 2020-01-16 DIAGNOSIS — Z00.8 ENCOUNTER FOR OTHER GENERAL EXAMINATION: ICD-10-CM

## 2020-01-16 PROCEDURE — 76700 US EXAM ABDOM COMPLETE: CPT | Mod: 26

## 2020-01-16 PROCEDURE — 76700 US EXAM ABDOM COMPLETE: CPT

## 2020-01-22 ENCOUNTER — APPOINTMENT (OUTPATIENT)
Dept: SURGERY | Facility: CLINIC | Age: 60
End: 2020-01-22
Payer: COMMERCIAL

## 2020-01-22 VITALS
WEIGHT: 218 LBS | OXYGEN SATURATION: 97 % | DIASTOLIC BLOOD PRESSURE: 80 MMHG | SYSTOLIC BLOOD PRESSURE: 140 MMHG | BODY MASS INDEX: 37.22 KG/M2 | HEIGHT: 64 IN | RESPIRATION RATE: 16 BRPM | HEART RATE: 83 BPM | TEMPERATURE: 98.3 F

## 2020-01-22 DIAGNOSIS — Z78.9 OTHER SPECIFIED HEALTH STATUS: ICD-10-CM

## 2020-01-22 PROCEDURE — 99204 OFFICE O/P NEW MOD 45 MIN: CPT

## 2020-01-24 ENCOUNTER — APPOINTMENT (OUTPATIENT)
Dept: MRI IMAGING | Facility: CLINIC | Age: 60
End: 2020-01-24
Payer: COMMERCIAL

## 2020-01-24 ENCOUNTER — OUTPATIENT (OUTPATIENT)
Dept: OUTPATIENT SERVICES | Facility: HOSPITAL | Age: 60
LOS: 1 days | End: 2020-01-24
Payer: COMMERCIAL

## 2020-01-24 DIAGNOSIS — Z00.8 ENCOUNTER FOR OTHER GENERAL EXAMINATION: ICD-10-CM

## 2020-01-24 DIAGNOSIS — Z90.721 ACQUIRED ABSENCE OF OVARIES, UNILATERAL: Chronic | ICD-10-CM

## 2020-01-24 DIAGNOSIS — Z98.890 OTHER SPECIFIED POSTPROCEDURAL STATES: Chronic | ICD-10-CM

## 2020-01-24 PROCEDURE — 74183 MRI ABD W/O CNTR FLWD CNTR: CPT | Mod: 26

## 2020-01-24 PROCEDURE — A9585: CPT

## 2020-01-24 PROCEDURE — 74183 MRI ABD W/O CNTR FLWD CNTR: CPT

## 2020-01-27 LAB
ALBUMIN SERPL ELPH-MCNC: 4.2 G/DL
ALP BLD-CCNC: 282 U/L
ALT SERPL-CCNC: 156 U/L
ANION GAP SERPL CALC-SCNC: 14 MMOL/L
AST SERPL-CCNC: 37 U/L
BILIRUB SERPL-MCNC: 0.4 MG/DL
BUN SERPL-MCNC: 13 MG/DL
CALCIUM SERPL-MCNC: 10.4 MG/DL
CHLORIDE SERPL-SCNC: 108 MMOL/L
CO2 SERPL-SCNC: 21 MMOL/L
CREAT SERPL-MCNC: 0.87 MG/DL
GLUCOSE SERPL-MCNC: 136 MG/DL
POTASSIUM SERPL-SCNC: 4.4 MMOL/L
PROT SERPL-MCNC: 6.6 G/DL
SODIUM SERPL-SCNC: 143 MMOL/L

## 2020-01-27 NOTE — HISTORY OF PRESENT ILLNESS
[de-identified] : 60 y/o female with epigastric pain since 1/6/20. Pain is worse after meals. Better after she limited fatty foods. Most recent attack was about a week ago, pain lasted 45 mins. Reports intermittent nausea. Denies fever,chills.\par US abdomen from 01/16/2020 showed small mobile gallstones. There is ring down artifact involving the anterior gallbladder wall consistent with adenomyomatosis. Bile ducts normal size. \par Had lab work twice in January and her LFTs are elevated (new finding) and up-trending. She is scheduled for an MRCP on 1/24.

## 2020-01-27 NOTE — CONSULT LETTER
[Dear  ___] : Dear ~KEITH, [Consult Letter:] : I had the pleasure of evaluating your patient, [unfilled]. [Please see my note below.] : Please see my note below. [Consult Closing:] : Thank you very much for allowing me to participate in the care of this patient.  If you have any questions, please do not hesitate to contact me. [Sincerely,] : Sincerely, [FreeTextEntry2] : VERONICA Duval  [FreeTextEntry3] : Rosio Olivia M.D., F.A.C.S., F.RUEL.S.C.R.S.\par Assistant Professor of Surgery\par [employer] [facility]\par \par

## 2020-01-27 NOTE — ASSESSMENT
[FreeTextEntry1] : 58 yo female with symptomatic cholelithiasis. I recommended laparoscopic cholecystectomy, possible open, and discussed the details, risks, benefits and alternatives of the procedure and recovery expectations with the patient. \par In addition, her LFTs are elevated and up-trending, which is concerning for choledocholithiasis. I will f/u on MRCP results, if + for CBD stones she will need an ERCP and I will refer her to GI. \par We will tentatively schedule the surgery in the meantime for the upcoming weeks.\par

## 2020-02-07 ENCOUNTER — OUTPATIENT (OUTPATIENT)
Dept: OUTPATIENT SERVICES | Facility: HOSPITAL | Age: 60
LOS: 1 days | End: 2020-02-07
Payer: COMMERCIAL

## 2020-02-07 VITALS
SYSTOLIC BLOOD PRESSURE: 134 MMHG | TEMPERATURE: 99 F | HEART RATE: 84 BPM | OXYGEN SATURATION: 96 % | RESPIRATION RATE: 16 BRPM | DIASTOLIC BLOOD PRESSURE: 84 MMHG | WEIGHT: 210.98 LBS | HEIGHT: 62 IN

## 2020-02-07 DIAGNOSIS — Z90.721 ACQUIRED ABSENCE OF OVARIES, UNILATERAL: Chronic | ICD-10-CM

## 2020-02-07 DIAGNOSIS — Z98.890 OTHER SPECIFIED POSTPROCEDURAL STATES: Chronic | ICD-10-CM

## 2020-02-07 DIAGNOSIS — K80.20 CALCULUS OF GALLBLADDER WITHOUT CHOLECYSTITIS WITHOUT OBSTRUCTION: ICD-10-CM

## 2020-02-07 DIAGNOSIS — I10 ESSENTIAL (PRIMARY) HYPERTENSION: ICD-10-CM

## 2020-02-07 DIAGNOSIS — Z01.818 ENCOUNTER FOR OTHER PREPROCEDURAL EXAMINATION: ICD-10-CM

## 2020-02-07 DIAGNOSIS — K21.9 GASTRO-ESOPHAGEAL REFLUX DISEASE WITHOUT ESOPHAGITIS: ICD-10-CM

## 2020-02-07 LAB
ALBUMIN SERPL ELPH-MCNC: 4.1 G/DL — SIGNIFICANT CHANGE UP (ref 3.3–5)
ALP SERPL-CCNC: 162 U/L — HIGH (ref 40–120)
ALT FLD-CCNC: 53 U/L — HIGH (ref 10–45)
ANION GAP SERPL CALC-SCNC: 13 MMOL/L — SIGNIFICANT CHANGE UP (ref 5–17)
AST SERPL-CCNC: 36 U/L — SIGNIFICANT CHANGE UP (ref 10–40)
BILIRUB SERPL-MCNC: 0.3 MG/DL — SIGNIFICANT CHANGE UP (ref 0.2–1.2)
BUN SERPL-MCNC: 12 MG/DL — SIGNIFICANT CHANGE UP (ref 7–23)
CALCIUM SERPL-MCNC: 10.4 MG/DL — SIGNIFICANT CHANGE UP (ref 8.4–10.5)
CHLORIDE SERPL-SCNC: 104 MMOL/L — SIGNIFICANT CHANGE UP (ref 96–108)
CO2 SERPL-SCNC: 23 MMOL/L — SIGNIFICANT CHANGE UP (ref 22–31)
CREAT SERPL-MCNC: 0.86 MG/DL — SIGNIFICANT CHANGE UP (ref 0.5–1.3)
GLUCOSE SERPL-MCNC: 89 MG/DL — SIGNIFICANT CHANGE UP (ref 70–99)
HCT VFR BLD CALC: 42.4 % — SIGNIFICANT CHANGE UP (ref 34.5–45)
HGB BLD-MCNC: 13 G/DL — SIGNIFICANT CHANGE UP (ref 11.5–15.5)
MCHC RBC-ENTMCNC: 25.9 PG — LOW (ref 27–34)
MCHC RBC-ENTMCNC: 30.7 GM/DL — LOW (ref 32–36)
MCV RBC AUTO: 84.6 FL — SIGNIFICANT CHANGE UP (ref 80–100)
NRBC # BLD: 0 /100 WBCS — SIGNIFICANT CHANGE UP (ref 0–0)
PLATELET # BLD AUTO: 366 K/UL — SIGNIFICANT CHANGE UP (ref 150–400)
POTASSIUM SERPL-MCNC: 4.1 MMOL/L — SIGNIFICANT CHANGE UP (ref 3.5–5.3)
POTASSIUM SERPL-SCNC: 4.1 MMOL/L — SIGNIFICANT CHANGE UP (ref 3.5–5.3)
PROT SERPL-MCNC: 7.4 G/DL — SIGNIFICANT CHANGE UP (ref 6–8.3)
RBC # BLD: 5.01 M/UL — SIGNIFICANT CHANGE UP (ref 3.8–5.2)
RBC # FLD: 14.6 % — HIGH (ref 10.3–14.5)
SODIUM SERPL-SCNC: 140 MMOL/L — SIGNIFICANT CHANGE UP (ref 135–145)
WBC # BLD: 7.24 K/UL — SIGNIFICANT CHANGE UP (ref 3.8–10.5)
WBC # FLD AUTO: 7.24 K/UL — SIGNIFICANT CHANGE UP (ref 3.8–10.5)

## 2020-02-07 PROCEDURE — G0463: CPT

## 2020-02-07 PROCEDURE — 85027 COMPLETE CBC AUTOMATED: CPT

## 2020-02-07 PROCEDURE — 80053 COMPREHEN METABOLIC PANEL: CPT

## 2020-02-07 RX ORDER — IBUPROFEN 200 MG
1 TABLET ORAL
Qty: 0 | Refills: 0 | DISCHARGE

## 2020-02-07 RX ORDER — CEFAZOLIN SODIUM 1 G
2000 VIAL (EA) INJECTION ONCE
Refills: 0 | Status: DISCONTINUED | OUTPATIENT
Start: 2020-02-13 | End: 2020-03-03

## 2020-02-07 NOTE — H&P PST ADULT - NSICDXPASTMEDICALHX_GEN_ALL_CORE_FT
PAST MEDICAL HISTORY:  Benign endometrial hyperplasia     GERD (gastroesophageal reflux disease)     Hypertension     Obesity     Osteoporosis     Ovarian cyst, left     Pituitary tumor h/o last MRI 2016    Polyp of corpus uteri

## 2020-02-07 NOTE — H&P PST ADULT - HISTORY OF PRESENT ILLNESS
58 yo F 58 yo F with h/o HTN, GERD c/o epigastric pain since 1/6/2020. Pt had PMD evaluation s/p abdominal ultrasound MRCP revealed gall bladder stones. Pt scheduled for laparoscopic cholecystectomy on 2/13/2020. Denies any fever, chills, N/V @ present.

## 2020-02-07 NOTE — H&P PST ADULT - NSICDXPROBLEM_GEN_ALL_CORE_FT
PROBLEM DIAGNOSES  Problem: Calculus of gallbladder without cholecystitis without obstruction  Assessment and Plan: Laparoscopic cholecystectomy  Labs- CBC, CMP  Pre op instructions discussed    Problem: GERD without esophagitis  Assessment and Plan: continue with PPI    Problem: Benign hypertension  Assessment and Plan: continue with meds

## 2020-02-07 NOTE — H&P PST ADULT - NEGATIVE CARDIOVASCULAR SYMPTOMS
no dyspnea on exertion/no palpitations/no orthopnea/no claudication/no peripheral edema/no chest pain/no paroxysmal nocturnal dyspnea

## 2020-02-07 NOTE — H&P PST ADULT - NSICDXPASTSURGICALHX_GEN_ALL_CORE_FT
PAST SURGICAL HISTORY:  History of left oophorectomy 2013    S/P D&C (status post dilation and curettage) 8/2019

## 2020-02-12 ENCOUNTER — TRANSCRIPTION ENCOUNTER (OUTPATIENT)
Age: 60
End: 2020-02-12

## 2020-02-12 VITALS
HEART RATE: 84 BPM | HEIGHT: 62 IN | SYSTOLIC BLOOD PRESSURE: 134 MMHG | TEMPERATURE: 99 F | RESPIRATION RATE: 16 BRPM | OXYGEN SATURATION: 96 % | WEIGHT: 210.98 LBS | DIASTOLIC BLOOD PRESSURE: 84 MMHG

## 2020-02-12 NOTE — PRE-ANESTHESIA EVALUATION ADULT - NSANTHPMHFT_GEN_ALL_CORE
58 yo F with h/o HTN, GERD c/o epigastric pain since 1/6/2020. Pt had PMD evaluation s/p abdominal ultrasound MRCP revealed gall bladder stones. Pituitary tumor h/o last MRI 2016; S/P D&C  8/2019.

## 2020-02-13 ENCOUNTER — OUTPATIENT (OUTPATIENT)
Dept: OUTPATIENT SERVICES | Facility: HOSPITAL | Age: 60
LOS: 1 days | End: 2020-02-13
Payer: COMMERCIAL

## 2020-02-13 ENCOUNTER — RESULT REVIEW (OUTPATIENT)
Age: 60
End: 2020-02-13

## 2020-02-13 ENCOUNTER — APPOINTMENT (OUTPATIENT)
Dept: SURGERY | Facility: HOSPITAL | Age: 60
End: 2020-02-13
Payer: COMMERCIAL

## 2020-02-13 VITALS
RESPIRATION RATE: 17 BRPM | HEART RATE: 90 BPM | OXYGEN SATURATION: 100 % | DIASTOLIC BLOOD PRESSURE: 80 MMHG | SYSTOLIC BLOOD PRESSURE: 144 MMHG | TEMPERATURE: 98 F

## 2020-02-13 DIAGNOSIS — Z90.721 ACQUIRED ABSENCE OF OVARIES, UNILATERAL: Chronic | ICD-10-CM

## 2020-02-13 DIAGNOSIS — K80.20 CALCULUS OF GALLBLADDER WITHOUT CHOLECYSTITIS WITHOUT OBSTRUCTION: ICD-10-CM

## 2020-02-13 DIAGNOSIS — Z98.890 OTHER SPECIFIED POSTPROCEDURAL STATES: Chronic | ICD-10-CM

## 2020-02-13 PROCEDURE — C1889: CPT

## 2020-02-13 PROCEDURE — 88304 TISSUE EXAM BY PATHOLOGIST: CPT | Mod: 26

## 2020-02-13 PROCEDURE — 47562 LAPAROSCOPIC CHOLECYSTECTOMY: CPT

## 2020-02-13 PROCEDURE — 47562 LAPAROSCOPIC CHOLECYSTECTOMY: CPT | Mod: 82

## 2020-02-13 PROCEDURE — 88304 TISSUE EXAM BY PATHOLOGIST: CPT

## 2020-02-13 RX ORDER — CHOLECALCIFEROL (VITAMIN D3) 125 MCG
1 CAPSULE ORAL
Qty: 0 | Refills: 0 | DISCHARGE

## 2020-02-13 RX ORDER — SODIUM CHLORIDE 9 MG/ML
3 INJECTION INTRAMUSCULAR; INTRAVENOUS; SUBCUTANEOUS EVERY 8 HOURS
Refills: 0 | Status: DISCONTINUED | OUTPATIENT
Start: 2020-02-13 | End: 2020-02-13

## 2020-02-13 RX ORDER — OMEPRAZOLE 10 MG/1
1 CAPSULE, DELAYED RELEASE ORAL
Qty: 0 | Refills: 0 | DISCHARGE

## 2020-02-13 RX ORDER — SODIUM CHLORIDE 9 MG/ML
1000 INJECTION, SOLUTION INTRAVENOUS
Refills: 0 | Status: DISCONTINUED | OUTPATIENT
Start: 2020-02-13 | End: 2020-03-03

## 2020-02-13 RX ORDER — CHLORHEXIDINE GLUCONATE 213 G/1000ML
1 SOLUTION TOPICAL ONCE
Refills: 0 | Status: DISCONTINUED | OUTPATIENT
Start: 2020-02-13 | End: 2020-02-13

## 2020-02-13 RX ORDER — AMLODIPINE BESYLATE 2.5 MG/1
1 TABLET ORAL
Qty: 0 | Refills: 0 | DISCHARGE

## 2020-02-13 RX ORDER — HYDROMORPHONE HYDROCHLORIDE 2 MG/ML
0.5 INJECTION INTRAMUSCULAR; INTRAVENOUS; SUBCUTANEOUS
Refills: 0 | Status: DISCONTINUED | OUTPATIENT
Start: 2020-02-13 | End: 2020-02-13

## 2020-02-13 RX ORDER — ACETAMINOPHEN 500 MG
975 TABLET ORAL ONCE
Refills: 0 | Status: DISCONTINUED | OUTPATIENT
Start: 2020-02-13 | End: 2020-03-03

## 2020-02-13 RX ORDER — OXYCODONE HYDROCHLORIDE 5 MG/1
1 TABLET ORAL
Qty: 8 | Refills: 0
Start: 2020-02-13

## 2020-02-13 RX ORDER — ONDANSETRON 8 MG/1
4 TABLET, FILM COATED ORAL ONCE
Refills: 0 | Status: DISCONTINUED | OUTPATIENT
Start: 2020-02-13 | End: 2020-02-13

## 2020-02-13 RX ORDER — LIDOCAINE HCL 20 MG/ML
0.2 VIAL (ML) INJECTION ONCE
Refills: 0 | Status: DISCONTINUED | OUTPATIENT
Start: 2020-02-13 | End: 2020-02-13

## 2020-02-13 RX ORDER — OXYCODONE HYDROCHLORIDE 5 MG/1
5 TABLET ORAL ONCE
Refills: 0 | Status: DISCONTINUED | OUTPATIENT
Start: 2020-02-13 | End: 2020-02-13

## 2020-02-13 RX ADMIN — HYDROMORPHONE HYDROCHLORIDE 0.5 MILLIGRAM(S): 2 INJECTION INTRAMUSCULAR; INTRAVENOUS; SUBCUTANEOUS at 11:10

## 2020-02-13 RX ADMIN — HYDROMORPHONE HYDROCHLORIDE 0.5 MILLIGRAM(S): 2 INJECTION INTRAMUSCULAR; INTRAVENOUS; SUBCUTANEOUS at 11:15

## 2020-02-13 RX ADMIN — HYDROMORPHONE HYDROCHLORIDE 0.5 MILLIGRAM(S): 2 INJECTION INTRAMUSCULAR; INTRAVENOUS; SUBCUTANEOUS at 11:25

## 2020-02-13 RX ADMIN — HYDROMORPHONE HYDROCHLORIDE 0.5 MILLIGRAM(S): 2 INJECTION INTRAMUSCULAR; INTRAVENOUS; SUBCUTANEOUS at 10:53

## 2020-02-13 NOTE — BRIEF OPERATIVE NOTE - OPERATION/FINDINGS
Lap gracy performed. Two Omkar ports were used as there was poor visualization upon entering the abdomen from the midline incision. The Gal bladder was dissected out and a critical view obtained prior to clipping the cystic duct and artery. Gal bladder was removed and stones were palpated. Gal bladder fossa was irrigated and hemostasis achieved. The ports were removed under direct visualizaton and the fascia was closed with figure of 8 vicryl stitches. Patient tolerated the procedure well

## 2020-02-13 NOTE — ASU DISCHARGE PLAN (ADULT/PEDIATRIC) - CARE PROVIDER_API CALL
Rosio Olivia)  ColonRectal Surgery; Surgery  [address], [address]  [place] [address]  Phone: [phone]  Fax: [phone]  Follow Up Time:

## 2020-02-13 NOTE — ASU DISCHARGE PLAN (ADULT/PEDIATRIC) - ASU DC SPECIAL INSTRUCTIONSFT
Please take 2 Extra Strength Tylenol every 6 hours for the first 5 days after surgery. If needed you can additionally take ibuprofen starting 2/15. Additionally, oxycodone has been sent to your pharmacy which you may take 1-2 pills every 4-6 hours as needed for the first couple of days. Please attend your follow-up appt with Dr. Olivia in the coming weeks.

## 2020-02-16 ENCOUNTER — INPATIENT (INPATIENT)
Facility: HOSPITAL | Age: 60
LOS: 2 days | Discharge: HOME | DRG: 446 | End: 2020-02-19
Attending: SURGERY | Admitting: SURGERY
Payer: COMMERCIAL

## 2020-02-16 VITALS
TEMPERATURE: 99 F | HEIGHT: 62 IN | SYSTOLIC BLOOD PRESSURE: 184 MMHG | DIASTOLIC BLOOD PRESSURE: 101 MMHG | RESPIRATION RATE: 20 BRPM | OXYGEN SATURATION: 98 % | HEART RATE: 119 BPM | WEIGHT: 210.1 LBS

## 2020-02-16 DIAGNOSIS — Z98.890 OTHER SPECIFIED POSTPROCEDURAL STATES: Chronic | ICD-10-CM

## 2020-02-16 DIAGNOSIS — Z90.721 ACQUIRED ABSENCE OF OVARIES, UNILATERAL: Chronic | ICD-10-CM

## 2020-02-16 PROCEDURE — 99285 EMERGENCY DEPT VISIT HI MDM: CPT

## 2020-02-17 DIAGNOSIS — K83.1 OBSTRUCTION OF BILE DUCT: ICD-10-CM

## 2020-02-17 LAB
ALBUMIN SERPL ELPH-MCNC: 3.5 G/DL — SIGNIFICANT CHANGE UP (ref 3.3–5)
ALBUMIN SERPL ELPH-MCNC: 4.2 G/DL — SIGNIFICANT CHANGE UP (ref 3.3–5)
ALP SERPL-CCNC: 301 U/L — HIGH (ref 40–120)
ALP SERPL-CCNC: 349 U/L — HIGH (ref 40–120)
ALT FLD-CCNC: 508 U/L — HIGH (ref 10–45)
ALT FLD-CCNC: 600 U/L — HIGH (ref 10–45)
ANION GAP SERPL CALC-SCNC: 14 MMOL/L — SIGNIFICANT CHANGE UP (ref 5–17)
ANION GAP SERPL CALC-SCNC: 9 MMOL/L — SIGNIFICANT CHANGE UP (ref 5–17)
APTT BLD: 28 SEC — SIGNIFICANT CHANGE UP (ref 27.5–36.3)
AST SERPL-CCNC: 358 U/L — HIGH (ref 10–40)
AST SERPL-CCNC: 546 U/L — HIGH (ref 10–40)
BASE EXCESS BLDV CALC-SCNC: 4.6 MMOL/L — HIGH (ref -2–2)
BASOPHILS # BLD AUTO: 0.07 K/UL — SIGNIFICANT CHANGE UP (ref 0–0.2)
BASOPHILS NFR BLD AUTO: 0.6 % — SIGNIFICANT CHANGE UP (ref 0–2)
BILIRUB SERPL-MCNC: 1.7 MG/DL — HIGH (ref 0.2–1.2)
BILIRUB SERPL-MCNC: 2.9 MG/DL — HIGH (ref 0.2–1.2)
BLD GP AB SCN SERPL QL: NEGATIVE — SIGNIFICANT CHANGE UP
BUN SERPL-MCNC: 10 MG/DL — SIGNIFICANT CHANGE UP (ref 7–23)
BUN SERPL-MCNC: 8 MG/DL — SIGNIFICANT CHANGE UP (ref 7–23)
CA-I SERPL-SCNC: 1.31 MMOL/L — HIGH (ref 1.12–1.3)
CALCIUM SERPL-MCNC: 10.1 MG/DL — SIGNIFICANT CHANGE UP (ref 8.4–10.5)
CALCIUM SERPL-MCNC: 10.9 MG/DL — HIGH (ref 8.4–10.5)
CHLORIDE BLDV-SCNC: 105 MMOL/L — SIGNIFICANT CHANGE UP (ref 96–108)
CHLORIDE SERPL-SCNC: 100 MMOL/L — SIGNIFICANT CHANGE UP (ref 96–108)
CHLORIDE SERPL-SCNC: 103 MMOL/L — SIGNIFICANT CHANGE UP (ref 96–108)
CO2 BLDV-SCNC: 31 MMOL/L — HIGH (ref 22–30)
CO2 SERPL-SCNC: 26 MMOL/L — SIGNIFICANT CHANGE UP (ref 22–31)
CO2 SERPL-SCNC: 27 MMOL/L — SIGNIFICANT CHANGE UP (ref 22–31)
CREAT SERPL-MCNC: 0.8 MG/DL — SIGNIFICANT CHANGE UP (ref 0.5–1.3)
CREAT SERPL-MCNC: 0.81 MG/DL — SIGNIFICANT CHANGE UP (ref 0.5–1.3)
EOSINOPHIL # BLD AUTO: 0.05 K/UL — SIGNIFICANT CHANGE UP (ref 0–0.5)
EOSINOPHIL NFR BLD AUTO: 0.4 % — SIGNIFICANT CHANGE UP (ref 0–6)
GAS PNL BLDV: 139 MMOL/L — SIGNIFICANT CHANGE UP (ref 135–145)
GAS PNL BLDV: SIGNIFICANT CHANGE UP
GAS PNL BLDV: SIGNIFICANT CHANGE UP
GLUCOSE BLDV-MCNC: 148 MG/DL — HIGH (ref 70–99)
GLUCOSE SERPL-MCNC: 150 MG/DL — HIGH (ref 70–99)
GLUCOSE SERPL-MCNC: 99 MG/DL — SIGNIFICANT CHANGE UP (ref 70–99)
HCO3 BLDV-SCNC: 29 MMOL/L — SIGNIFICANT CHANGE UP (ref 21–29)
HCT VFR BLD CALC: 36.4 % — SIGNIFICANT CHANGE UP (ref 34.5–45)
HCT VFR BLD CALC: 42.4 % — SIGNIFICANT CHANGE UP (ref 34.5–45)
HCT VFR BLDA CALC: 44 % — SIGNIFICANT CHANGE UP (ref 39–50)
HGB BLD CALC-MCNC: 14.2 G/DL — SIGNIFICANT CHANGE UP (ref 11.5–15.5)
HGB BLD-MCNC: 11.2 G/DL — LOW (ref 11.5–15.5)
HGB BLD-MCNC: 13.2 G/DL — SIGNIFICANT CHANGE UP (ref 11.5–15.5)
HOROWITZ INDEX BLDV+IHG-RTO: SIGNIFICANT CHANGE UP
IMM GRANULOCYTES NFR BLD AUTO: 0.2 % — SIGNIFICANT CHANGE UP (ref 0–1.5)
INR BLD: 1.08 RATIO — SIGNIFICANT CHANGE UP (ref 0.88–1.16)
LACTATE BLDV-MCNC: 1.1 MMOL/L — SIGNIFICANT CHANGE UP (ref 0.7–2)
LYMPHOCYTES # BLD AUTO: 1.1 K/UL — SIGNIFICANT CHANGE UP (ref 1–3.3)
LYMPHOCYTES # BLD AUTO: 9.1 % — LOW (ref 13–44)
MAGNESIUM SERPL-MCNC: 2.2 MG/DL — SIGNIFICANT CHANGE UP (ref 1.6–2.6)
MCHC RBC-ENTMCNC: 26.2 PG — LOW (ref 27–34)
MCHC RBC-ENTMCNC: 26.3 PG — LOW (ref 27–34)
MCHC RBC-ENTMCNC: 30.8 GM/DL — LOW (ref 32–36)
MCHC RBC-ENTMCNC: 31.1 GM/DL — LOW (ref 32–36)
MCV RBC AUTO: 84.1 FL — SIGNIFICANT CHANGE UP (ref 80–100)
MCV RBC AUTO: 85.4 FL — SIGNIFICANT CHANGE UP (ref 80–100)
MONOCYTES # BLD AUTO: 0.7 K/UL — SIGNIFICANT CHANGE UP (ref 0–0.9)
MONOCYTES NFR BLD AUTO: 5.8 % — SIGNIFICANT CHANGE UP (ref 2–14)
NEUTROPHILS # BLD AUTO: 10.18 K/UL — HIGH (ref 1.8–7.4)
NEUTROPHILS NFR BLD AUTO: 83.9 % — HIGH (ref 43–77)
NRBC # BLD: 0 /100 WBCS — SIGNIFICANT CHANGE UP (ref 0–0)
NRBC # BLD: 0 /100 WBCS — SIGNIFICANT CHANGE UP (ref 0–0)
OTHER CELLS CSF MANUAL: 10 ML/DL — LOW (ref 18–22)
PCO2 BLDV: 46 MMHG — SIGNIFICANT CHANGE UP (ref 35–50)
PH BLDV: 7.42 — SIGNIFICANT CHANGE UP (ref 7.35–7.45)
PHOSPHATE SERPL-MCNC: 2.9 MG/DL — SIGNIFICANT CHANGE UP (ref 2.5–4.5)
PLATELET # BLD AUTO: 318 K/UL — SIGNIFICANT CHANGE UP (ref 150–400)
PLATELET # BLD AUTO: 362 K/UL — SIGNIFICANT CHANGE UP (ref 150–400)
PO2 BLDV: 31 MMHG — SIGNIFICANT CHANGE UP (ref 25–45)
POTASSIUM BLDV-SCNC: 3.4 MMOL/L — LOW (ref 3.5–5.3)
POTASSIUM SERPL-MCNC: 3.3 MMOL/L — LOW (ref 3.5–5.3)
POTASSIUM SERPL-MCNC: 3.4 MMOL/L — LOW (ref 3.5–5.3)
POTASSIUM SERPL-SCNC: 3.3 MMOL/L — LOW (ref 3.5–5.3)
POTASSIUM SERPL-SCNC: 3.4 MMOL/L — LOW (ref 3.5–5.3)
PROT SERPL-MCNC: 6.4 G/DL — SIGNIFICANT CHANGE UP (ref 6–8.3)
PROT SERPL-MCNC: 7.9 G/DL — SIGNIFICANT CHANGE UP (ref 6–8.3)
PROTHROM AB SERPL-ACNC: 12.5 SEC — SIGNIFICANT CHANGE UP (ref 10–12.9)
RBC # BLD: 4.26 M/UL — SIGNIFICANT CHANGE UP (ref 3.8–5.2)
RBC # BLD: 5.04 M/UL — SIGNIFICANT CHANGE UP (ref 3.8–5.2)
RBC # FLD: 14.8 % — HIGH (ref 10.3–14.5)
RBC # FLD: 15.1 % — HIGH (ref 10.3–14.5)
RH IG SCN BLD-IMP: POSITIVE — SIGNIFICANT CHANGE UP
SAO2 % BLDV: 51 % — LOW (ref 67–88)
SODIUM SERPL-SCNC: 139 MMOL/L — SIGNIFICANT CHANGE UP (ref 135–145)
SODIUM SERPL-SCNC: 140 MMOL/L — SIGNIFICANT CHANGE UP (ref 135–145)
WBC # BLD: 12.13 K/UL — HIGH (ref 3.8–10.5)
WBC # BLD: 9.89 K/UL — SIGNIFICANT CHANGE UP (ref 3.8–10.5)
WBC # FLD AUTO: 12.13 K/UL — HIGH (ref 3.8–10.5)
WBC # FLD AUTO: 9.89 K/UL — SIGNIFICANT CHANGE UP (ref 3.8–10.5)

## 2020-02-17 PROCEDURE — 74183 MRI ABD W/O CNTR FLWD CNTR: CPT | Mod: 26

## 2020-02-17 PROCEDURE — 74177 CT ABD & PELVIS W/CONTRAST: CPT | Mod: 26

## 2020-02-17 RX ORDER — ENOXAPARIN SODIUM 100 MG/ML
40 INJECTION SUBCUTANEOUS DAILY
Refills: 0 | Status: DISCONTINUED | OUTPATIENT
Start: 2020-02-17 | End: 2020-02-19

## 2020-02-17 RX ORDER — MORPHINE SULFATE 50 MG/1
4 CAPSULE, EXTENDED RELEASE ORAL ONCE
Refills: 0 | Status: DISCONTINUED | OUTPATIENT
Start: 2020-02-17 | End: 2020-02-17

## 2020-02-17 RX ORDER — POTASSIUM CHLORIDE 20 MEQ
20 PACKET (EA) ORAL
Refills: 0 | Status: DISCONTINUED | OUTPATIENT
Start: 2020-02-17 | End: 2020-02-17

## 2020-02-17 RX ORDER — ACETAMINOPHEN 500 MG
975 TABLET ORAL EVERY 6 HOURS
Refills: 0 | Status: DISCONTINUED | OUTPATIENT
Start: 2020-02-17 | End: 2020-02-19

## 2020-02-17 RX ORDER — ONDANSETRON 8 MG/1
4 TABLET, FILM COATED ORAL ONCE
Refills: 0 | Status: COMPLETED | OUTPATIENT
Start: 2020-02-17 | End: 2020-02-17

## 2020-02-17 RX ORDER — SODIUM CHLORIDE 9 MG/ML
1000 INJECTION, SOLUTION INTRAVENOUS ONCE
Refills: 0 | Status: COMPLETED | OUTPATIENT
Start: 2020-02-17 | End: 2020-02-17

## 2020-02-17 RX ORDER — PANTOPRAZOLE SODIUM 20 MG/1
40 TABLET, DELAYED RELEASE ORAL
Refills: 0 | Status: DISCONTINUED | OUTPATIENT
Start: 2020-02-17 | End: 2020-02-19

## 2020-02-17 RX ORDER — AMLODIPINE BESYLATE 2.5 MG/1
10 TABLET ORAL DAILY
Refills: 0 | Status: DISCONTINUED | OUTPATIENT
Start: 2020-02-17 | End: 2020-02-19

## 2020-02-17 RX ORDER — POTASSIUM CHLORIDE 20 MEQ
10 PACKET (EA) ORAL
Refills: 0 | Status: COMPLETED | OUTPATIENT
Start: 2020-02-17 | End: 2020-02-17

## 2020-02-17 RX ORDER — PIPERACILLIN AND TAZOBACTAM 4; .5 G/20ML; G/20ML
3.38 INJECTION, POWDER, LYOPHILIZED, FOR SOLUTION INTRAVENOUS EVERY 8 HOURS
Refills: 0 | Status: DISCONTINUED | OUTPATIENT
Start: 2020-02-17 | End: 2020-02-18

## 2020-02-17 RX ORDER — OXYCODONE HYDROCHLORIDE 5 MG/1
5 TABLET ORAL EVERY 4 HOURS
Refills: 0 | Status: DISCONTINUED | OUTPATIENT
Start: 2020-02-17 | End: 2020-02-17

## 2020-02-17 RX ORDER — PIPERACILLIN AND TAZOBACTAM 4; .5 G/20ML; G/20ML
3.38 INJECTION, POWDER, LYOPHILIZED, FOR SOLUTION INTRAVENOUS ONCE
Refills: 0 | Status: COMPLETED | OUTPATIENT
Start: 2020-02-17 | End: 2020-02-17

## 2020-02-17 RX ORDER — SODIUM CHLORIDE 9 MG/ML
1000 INJECTION, SOLUTION INTRAVENOUS
Refills: 0 | Status: DISCONTINUED | OUTPATIENT
Start: 2020-02-17 | End: 2020-02-18

## 2020-02-17 RX ADMIN — Medication 100 MILLIEQUIVALENT(S): at 21:39

## 2020-02-17 RX ADMIN — PIPERACILLIN AND TAZOBACTAM 25 GRAM(S): 4; .5 INJECTION, POWDER, LYOPHILIZED, FOR SOLUTION INTRAVENOUS at 14:40

## 2020-02-17 RX ADMIN — AMLODIPINE BESYLATE 10 MILLIGRAM(S): 2.5 TABLET ORAL at 05:09

## 2020-02-17 RX ADMIN — SODIUM CHLORIDE 1000 MILLILITER(S): 9 INJECTION, SOLUTION INTRAVENOUS at 00:37

## 2020-02-17 RX ADMIN — PIPERACILLIN AND TAZOBACTAM 25 GRAM(S): 4; .5 INJECTION, POWDER, LYOPHILIZED, FOR SOLUTION INTRAVENOUS at 23:56

## 2020-02-17 RX ADMIN — PIPERACILLIN AND TAZOBACTAM 200 GRAM(S): 4; .5 INJECTION, POWDER, LYOPHILIZED, FOR SOLUTION INTRAVENOUS at 03:19

## 2020-02-17 RX ADMIN — SODIUM CHLORIDE 2000 MILLILITER(S): 9 INJECTION, SOLUTION INTRAVENOUS at 05:09

## 2020-02-17 RX ADMIN — MORPHINE SULFATE 4 MILLIGRAM(S): 50 CAPSULE, EXTENDED RELEASE ORAL at 00:40

## 2020-02-17 RX ADMIN — Medication 100 MILLIEQUIVALENT(S): at 23:21

## 2020-02-17 RX ADMIN — Medication 1 TABLET(S): at 18:33

## 2020-02-17 RX ADMIN — SODIUM CHLORIDE 1000 MILLILITER(S): 9 INJECTION, SOLUTION INTRAVENOUS at 01:06

## 2020-02-17 RX ADMIN — Medication 100 MILLIEQUIVALENT(S): at 12:59

## 2020-02-17 RX ADMIN — Medication 20 MILLIEQUIVALENT(S): at 05:09

## 2020-02-17 RX ADMIN — MORPHINE SULFATE 4 MILLIGRAM(S): 50 CAPSULE, EXTENDED RELEASE ORAL at 00:36

## 2020-02-17 RX ADMIN — PANTOPRAZOLE SODIUM 40 MILLIGRAM(S): 20 TABLET, DELAYED RELEASE ORAL at 05:10

## 2020-02-17 RX ADMIN — ENOXAPARIN SODIUM 40 MILLIGRAM(S): 100 INJECTION SUBCUTANEOUS at 13:01

## 2020-02-17 RX ADMIN — ONDANSETRON 4 MILLIGRAM(S): 8 TABLET, FILM COATED ORAL at 01:06

## 2020-02-17 NOTE — H&P ADULT - ATTENDING COMMENTS
I have seen and evaluated patient and reviewed data and chart.  Agree with resident assessment and plan. Patient feeling better this morning and Bili and Alk phos both improved.  Most likely CBD stone.  MRCP pending.  ABX for leukocytosis, possible cholangitis

## 2020-02-17 NOTE — ED PROVIDER NOTE - PMH
Benign endometrial hyperplasia    GERD (gastroesophageal reflux disease)    Hypertension    Obesity    Osteoporosis    Ovarian cyst, left    Pituitary tumor  h/o last MRI 2016  Polyp of corpus uteri

## 2020-02-17 NOTE — H&P ADULT - PROBLEM SELECTOR PLAN 1
PLAN:   - admit to surgery, Dr. Olivia  - MRCP to evaluate for retained stone vs CBD injury  - NPO/IVF   - antibiotics  - DVT ppx  - continue home medications  - Patient discussed with Attending, Dr. Olivia      [facility] #[id number]

## 2020-02-17 NOTE — H&P ADULT - NSHPLABSRESULTS_GEN_ALL_CORE
CBC (02-17 @ 00:34)                              13.2                           12.13<H>  )----------------(  362        83.9<H>% Neutrophils, 9.1<L>% Lymphocytes, ANC: 10.18<H>                              42.4                  BMP (02-17 @ 00:34)             140     |  100     |  10    		Ca++ --      Ca 10.9<H>             ---------------------------------( 150<H>		Mg --                 3.4<L>  |  26      |  0.81  			Ph --        LFTs (02-17 @ 00:34)      TPro 7.9 / Alb 4.2 / TBili 2.9<H> / DBili -- / <H> / <H> / AlkPhos 349<H>    Coags (02-17 @ 00:34)  aPTT 28.0 / INR 1.08 / PT 12.5    ABG (02-17 @ 00:34)      /  /  /  /  / %     Lactate:   1.1    VBG (02-17 @ 00:34)     7.42 / 46 / 31 / 29 / 4.6<H> / 51<L>%      '    EXAM:  CT ABDOMEN AND PELVIS IC                            PROCEDURE DATE:  02/17/2020            INTERPRETATION:  CLINICAL INFORMATION: Abdominal pain and vomiting. Recent cholecystectomy.     COMPARISON: MR abdomen 1/24/2020. US 1/16/2020.    PROCEDURE:   CT of the Abdomen and Pelvis was performed with intravenous contrast.   Intravenous contrast: 90 ml Omnipaque 350. 10 ml discarded.  Oral contrast: None.  Sagittal and coronal reformats were performed.    FINDINGS:    LOWER CHEST: Subsegmental atelectasis.    LIVER: Unremarkable.  GALLBLADDER/BILE DUCTS: Interval cholecystectomy. Trace free fluid in the bladder fossa. Prominent central intrahepatic biliary ducts. Common bile duct dilatation (0.9 cm).    PANCREAS: Unremarkable.  SPLEEN: Unremarkable.    ADRENALS: Stable mild diffuse bilateral adrenal thickening.  KIDNEYS/URETERS: No hydronephrosis, hydroureter or significant perinephric stranding.    BLADDER: Partially distended.  REPRODUCTIVE ORGANS: IUD in place. No adnexal mass.    BOWEL: Moderate-sized hiatal hernia. No bowel obstruction. Unremarkable appendix. No significant bowel wall thickening.  PERITONEUM: Foci of free air in the right abdomen, reflecting recent postsurgical changes.  VESSELS: Unremarkable.   RETROPERITONEUM: No lymphadenopathy.    ABDOMINAL WALL/SOFT TISSUES: Mild asymmetric enlargement of the right upper rectus abdominis muscle with stranding and foci of air in the overlying subcutaneous the soft tissue. Small fat-containing umbilical hernia.  BONES: Degenerative changes of the spine.     IMPRESSION:     Interval postsurgical changes as described. Trace free fluid in the gallbladder fossa. Prominent central intrahepatic biliary ducts. Common bile duct dilatation. If there is clinical suspicion for choledocholithiasis, MRCP may be obtained for further evaluation.    Mild asymmetric enlargement of the right upper rectus abdominis muscle with stranding and foci of air in the overlying subcutaneous the soft tissue. Recommend clinical correlation to assess underlying infection/cellulitis.    Additional findings as described.

## 2020-02-17 NOTE — ED ADULT NURSE NOTE - NSIMPLEMENTINTERV_GEN_ALL_ED
Implemented All Universal Safety Interventions:  [place] to call system. Call bell, personal items and telephone within reach. Instruct patient to call for assistance. Room bathroom lighting operational. Non-slip footwear when patient is off stretcher. Physically safe environment: no spills, clutter or unnecessary equipment. Stretcher in lowest position, wheels locked, appropriate side rails in place.

## 2020-02-17 NOTE — H&P ADULT - NSHPPHYSICALEXAM_GEN_ALL_CORE
NAD  EOMI  airway patent  +S1S2 no mrg  CTA b/l  soft, steri-strips intact, no skin erythema/discharge, non-distended, tender to palpation greatest in the epigastrium/RUQ, no guarding  no le edema  normal mood and affect  spont moving all extremities

## 2020-02-17 NOTE — ED PROVIDER NOTE - ATTENDING CONTRIBUTION TO CARE
pt 3 days s/p cholecystectomy now with worsening upper abd pain, n/v. on exam, no signs of peritonitis but she does exhibit diffuse upper abd tenderness. superficially the surgical wound looks well. vitals unremarkable. will send labs and perform CT abd/pelvis to r/o abscess, bleeding, or other acute process. will touch base w surgery team.

## 2020-02-17 NOTE — ED PROVIDER NOTE - OBJECTIVE STATEMENT
59F hx obesity, gerd, recent lap gracy under dr. Olivia on 2/13, p/w increased abdominal pain today assoc w/ vomiting x 3 times and sensation that she can't pass gas. Pt also endorses intermittent chills. Pt denies discharge from the wounds, diarrhea, blood in stool, chest pain/sob.

## 2020-02-17 NOTE — ED PROVIDER NOTE - CLINICAL SUMMARY MEDICAL DECISION MAKING FREE TEXT BOX
59F p/w increased abdominal pain and vomiting in setting of recent lap gracy here. Surg wounds c/d/i. +ttp across upper abdomen. Pt nontoxic appearing, tachycardic likely from pain. Will give morphine, check labs, CTAP, and surg consult.

## 2020-02-17 NOTE — PATIENT PROFILE ADULT - FLU SEASON?
Name: Lala George  Seen at the request of Heidy Jama for Diabetes (3/6/2018).  HPI:  Lala George is a 49 year old female who presents for the evaluation/management of:    1. Type 2 DM:  Originally diagnosed: 2014.  Glucose slightly elevated 109-113 the year prior to diagnosis.  Glucose at the time of diagnosis was 317.    Current Regimen: Metformin XR 2000 mg/day, Tanzeum 50 mg weekly, Tresiba 50 units q hs, Humalog 10 units tid.  Initially treated with Metformin, Lantus insulin was added a short time after diagnosis. Lantus was later changed to Basaglar due to insurance, most recently changed to Tresiba.  Humalog recently added.  Was briefly treated with glipizide in , for about 2-3 months, discontinued due to ineffectiveness  Trulicity was added 2017, this was later changed to Tanzeum due to insurance formulary  BS checks: Continuous, Sandi personal CGM  Sandi:  Average B.  100% above target range.       Works night shift 10 pm to 6 am, Tuesday - .    Complications:   Diabetes Complications  Description / Detail    Diabetic Retinopathy  No retinopathy, last eye exam ?    CAD / PAD  No   Neuropathy  No   Nephropathy / Microalbuminuria  Elevated urine microalbumin x 1   Gastroparesis  No   Hypoglycemia Unawareness  No     2. Hypertension: Blood Pressure today:   BP Readings from Last 3 Encounters:   18 124/80   18 134/86   18 138/90   .  Blood pressure medications include losartan 100 mg qd, HCTZ 25 mg qd.  Takes medications everyday without forgetting a dose.  Denies feeling lightheaded or dizzy.    3. Hyperlipidemia: Takes atorvastatin 10 mg qd for lipid control.  Denies muscle aches of pains.       4. Prevention:  Flu Shot- 2017  Pneumovax- 3/2/2015  Opthalmology-Yes: overdue, referral provided.  Dental-recommend semiannually  ASA-No  Smoking- No  PMH/PSH:  Past Medical History:   Diagnosis Date     BENIGN HYPERTENSION 2/15/2006     Mild persistent asthma       Past Surgical History:   Procedure Laterality Date     NO HISTORY OF SURGERY       Family Hx:  Family History   Problem Relation Age of Onset     Breast Cancer Mother 30     first diagnosed in her early 30's     Hypertension Mother      DIABETES Mother      C.ALOVELY Mother      CHF 50s ,pacemaker late 50s, CABG 60s     CDANIEL Maternal Aunt      60s     Glaucoma No family hx of      Macular Degeneration No family hx of      Thyroid disease: No         DM2: Yes: mother and one sibling         Autoimmune: DM1, SLE, RA, Vitiligo No    Social Hx:  Social History     Social History     Marital status: Single     Spouse name: N/A     Number of children: N/A     Years of education: N/A     Occupational History      Other     part time, [employer]     Social History Main Topics     Smoking status: Never Smoker     Smokeless tobacco: Never Used     Alcohol use Yes      Comment: rare--one to two per week     Drug use: No     Sexual activity: No     Other Topics Concern     Not on file     Social History Narrative          MEDICATIONS:  has a current medication list which includes the following prescription(s): albiglutide, albuterol, atorvastatin, basaglar, beclomethasone, blood glucose monitoring, blood glucose monitoring, blood glucose monitoring, freestyle estiven reader, continuous blood glucose monitoring, humalog kwikpen, hydrochlorothiazide, insulin degludec, losartan, metformin, sertraline, and ulticare mini.    ROS     ROS: 10 point ROS neg other than the symptoms noted above in the HPI.    Physical Exam   VS: /80 (BP Location: Right arm, Patient Position: Chair, Cuff Size: Adult Large)  Pulse 76  Temp 98  F (36.7  C) (Oral)  Wt 100.1 kg (220 lb 9.6 oz)  LMP 01/18/2013  Breastfeeding? No  BMI 36.71 kg/m2  GENERAL: AXOX3, NAD, well dressed, answering questions appropriately, appears stated age.  HEENT:  no exophthalmos, no proptosis, EOMI, no lig lag, no retraction  CV: RRR, no rubs, gallops, no  murmurs  LUNGS: CTAB, no wheezes, rales, or rhonchi  ABDOMEN: soft, nontender, nondistended  EXTREMITIES: no edema, +pulses, no rashes, no lesions  NEUROLOGY: CN grossly intact, no tremors  MSK: grossly intact  SKIN: no rashes, no lesions    LABS:  A1c:   Component      Latest Ref Rng & Units 4/10/2017 1/24/2018 4/23/2018   Hemoglobin A1C      0 - 5.6 % 13.0 (H) 10.1 (H) 11.9 (H)     Basic Metabolic Panel:  !COMPREHENSIVE Latest Ref Rng & Units 1/24/2018   SODIUM 133 - 144 mmol/L 136   POTASSIUM 3.4 - 5.3 mmol/L 4.1   CHLORIDE 94 - 109 mmol/L 101   BUN 7 - 30 mg/dL 18   Creatinine 0.52 - 1.04 mg/dL 0.73   Glucose 70 - 99 mg/dL 344 (H)   ANION GAP 3 - 14 mmol/L 6   CALCIUM 8.5 - 10.1 mg/dL 9.0   ALBUMIN 3.4 - 5.0 g/dL 3.9   PROTEIN, TOTAL 6.8 - 8.8 g/dL 7.1     LFTS/Cholesterol Panel:  !LIPID/HEPATIC Latest Ref Rng & Units 2/21/2018   CHOLESTEROL <200 mg/dL 174   TRIGLYCERIDES <150 mg/dL 138   HDL CHOLESTEROL >49 mg/dL 62   LDL CHOLESTEROL, CALCULATED <100 mg/dL 84   VLDL-CHOLESTEROL 0 - 30 mg/dL    NON HDL CHOLESTEROL <130 mg/dL 112     !LIPID/HEPATIC Latest Ref Rng & Units 12/31/2014 4/1/2015 5/18/2016   AST 0 - 45 U/L 96 (H) 67 (H) 124 (H)   ALT 0 - 50 U/L 165 (H) 120 (H) 152 (H)     !LIPID/HEPATIC Latest Ref Rng & Units 1/4/2017 4/10/2017 1/24/2018   AST 0 - 45 U/L 68 (H) 35 28   ALT 0 - 50 U/L 104 (H) 70 (H) 37     Thyroid Function:   !THYROID Latest Ref Rng & Units 1/24/2018 7/10/2017 5/18/2016   TSH 0.40 - 4.00 mU/L 2.40 2.88 3.09     Urine MicroAlbumin:  Component    Latest Ref Rng & Units 1/21/2015 5/18/2016 1/24/2018   Creatinine Urine    mg/dL 100 73 41   Albumin Urine mg/L    mg/L 22 35 6   Albumin Urine mg/g Cr    0 - 25 mg/g Cr 22.11 47.94 (H) 14.99     Vitamin D:    All pertinent notes, labs, and images personally reviewed by me.     A/P  Ms.Shari George is a 49 year old here for the evaluation/management of diabetes:    1. DM2 - Uncontrolled.  Continue Tresiba 50 units qd.  Increase Humalog to 15  units tid + 1:30>150.  Follow up within one month with diabetes ed or myself, upload Sandi for review and further insulin dose adjustments.  Referral previously provided for eye exam  There is some variability among people, most will usually develop symptoms suggestive of hypoglycemia when blood glucose levels are lowered to the mid 60's. The first set of symptoms are called adrenergic. Patients may experience any of the following nervousness, sweating, intense hunger, trembling, weakness, palpitations, and difficulty speaking.   The acute management of hypoglycemia involves the rapid delivery of a source of easily absorbed sugar. Regular soda, juice, lifesavers, table sugar, are good options. 15 grams of glucose is the dose that is given, followed by an assessment of symptoms and a blood glucose check if possible. If after 10 minutes there is no improvement, another 10-15 grams should be given. This can be repeated up to three times. The equivalency of 10-15 grams of glucose (approximate servings) are: 3-5 hard candies, 3 teaspoons of sugar, or 1/2 cup of regular soda or juice.      2. Hypertension - Controlled.    3. Hyperlipidemia - On statin therapy.    Labs ordered today:   No orders of the defined types were placed in this encounter.      Radiology/Consults ordered today: None    All questions were answered.  The patient indicates understanding of the above issues and agrees with the plan set forth.  Total face to face time greater than or equal to 25 minutes.       Follow-up:  3 months in clinic with kobe Boudreaux NP  Endocrinology  [facility] [place]  CC:    Yes...

## 2020-02-17 NOTE — CONSULT NOTE ADULT - ASSESSMENT
Impression:  # Leukocytosis, Abdominal pain, elevated liver enzymes with moderate biliary dilatation + 2 mm distal CBD calculus seenon MRCP all represent likely cholangitis   # s/p lap cholecystectomy for cholelithiasis on 2/13    Recommendations:  - Send x2 blood cultures ( already in abx)   - Continue with Zosyn  - Clear liquid diet now  - IVF support  - NPO after midnight for ERCP tomorrow   - Daily CBC, CMP

## 2020-02-17 NOTE — H&P ADULT - ASSESSMENT
ASSESSMENT: Patient is a 59y old f with dilated biliary ducts and elevated bilirubin and liver enzymes POD4 from laparoscopic cholecystectomy

## 2020-02-17 NOTE — CONSULT NOTE ADULT - SUBJECTIVE AND OBJECTIVE BOX
Chief Complaint:  abdominal pain    HPI:  59F w/ history of HTN, GERD s/p lap cholecystectomy for cholelithiasis on 2/13 by dr. Olivia now presents with abdominal pain, nausea, vomiting x3.   Patient states the pain started on PO#1 across her upper abdomen. She was not passing gas and has only had one bowel movement. The pain continued and was not significantly improved with pain medication.  Pt also endorses intermittent chills. Pt denies discharge from the wounds, diarrhea, blood in stool, chest pain/sob.    Allergies:  No Known Allergies      Home Medications:   * Patient Currently Takes Medications as of 13-Feb-2020 11:02 documented in Structured Notes  · 	oxyCODONE 5 mg oral tablet: 1 tab(s) orally every 4 hours, As Needed -Moderate Pain (4 - 6) MDD:8  · 	Vitamin D3 1000 intl units (25 mcg) oral tablet: 1 tab(s) orally once a day  · 	Calcium 500+D: 1  orally once a day  · 	omeprazole 20 mg oral delayed release capsule: 1 cap(s) orally every other day  · 	amLODIPine 10 mg oral tablet: 1 tab(s) orally once a day    Hospital Medications:  acetaminophen   Tablet .. 975 milliGRAM(s) Oral every 6 hours PRN  amLODIPine   Tablet 10 milliGRAM(s) Oral daily  calcium carbonate 1250 mG  + Vitamin D (OsCal 500 + D) 1 Tablet(s) Oral daily  enoxaparin Injectable 40 milliGRAM(s) SubCutaneous daily  lactated ringers. 1000 milliLiter(s) IV Continuous <Continuous>  pantoprazole    Tablet 40 milliGRAM(s) Oral before breakfast  piperacillin/tazobactam IVPB.. 3.375 Gram(s) IV Intermittent every 8 hours  potassium chloride  10 mEq/100 mL IVPB 10 milliEquivalent(s) IV Intermittent every 1 hour      PMHX/PSHX:  Obesity  Benign endometrial hyperplasia  Hypertension  Morbidly obese  Osteoporosis  Obesity  GERD (gastroesophageal reflux disease)  Polyp of corpus uteri  Ovarian cyst, left  Pituitary tumor  S/P D&C (status post dilation and curettage)  History of left oophorectomy      Family history:  Family history of thyroid cancer  Family history of Alzheimer's disease      Social History: no smoking    ROS:   General:  No fevers, chills or night sweats.  ENT:  No sore throat or dysphagia  CV:  No pain or palpitations  Resp:  No dyspnea, cough, wheezing  GI:  as above  Skin:  No rash or edema      PHYSICAL EXAM:   GENERAL:  NAD, Appears stated age  HEENT:  NC/AT,  conjunctivae clear and pink, sclera -anicteric  CHEST:  CTA B/L, Normal effort  HEART:  RRR S1/S2, No murmurs  ABDOMEN: steri-strips intact, no skin erythema/discharge, non-distended, tender to palpation greatest in the epigastrium/RUQ, no guarding  EXTREMITIES:  No cyanosis or Edema  SKIN:  Warm & Dry. No rash or erythema  NEURO:  Alert, oriented    Vital Signs:  Vital Signs Last 24 Hrs  T(C): 37.2 (17 Feb 2020 17:15), Max: 37.2 (17 Feb 2020 17:15)  T(F): 98.9 (17 Feb 2020 17:15), Max: 98.9 (17 Feb 2020 17:15)  HR: 84 (17 Feb 2020 17:15) (70 - 119)  BP: 130/80 (17 Feb 2020 17:15) (125/76 - 184/101)  BP(mean): --  RR: 18 (17 Feb 2020 17:15) (16 - 20)  SpO2: 94% (17 Feb 2020 17:15) (94% - 98%)  Daily Height in cm: 157.48 (16 Feb 2020 21:33)    Daily     LABS:                        11.2   9.89  )-----------( 318      ( 17 Feb 2020 07:18 )             36.4     Mean Cell Volume: 85.4 fl (02-17-20 @ 07:18)    02-17    139  |  103  |  8   ----------------------------<  99  3.3<L>   |  27  |  0.80    Ca    10.1      17 Feb 2020 07:17  Phos  2.9     02-17  Mg     2.2     02-17    TPro  6.4  /  Alb  3.5  /  TBili  1.7<H>  /  DBili  x   /  AST  358<H>  /  ALT  508<H>  /  AlkPhos  301<H>  02-17    LIVER FUNCTIONS - ( 17 Feb 2020 07:17 )  Alb: 3.5 g/dL / Pro: 6.4 g/dL / ALK PHOS: 301 U/L / ALT: 508 U/L / AST: 358 U/L / GGT: x           PT/INR - ( 17 Feb 2020 00:34 )   PT: 12.5 sec;   INR: 1.08 ratio         PTT - ( 17 Feb 2020 00:34 )  PTT:28.0 sec                            11.2   9.89  )-----------( 318      ( 17 Feb 2020 07:18 )             36.4                         13.2   12.13 )-----------( 362      ( 17 Feb 2020 00:34 )             42.4     Imaging:  < from: MR MRCP w/wo IV Cont (02.17.20 @ 12:11) >  EXAM:  MR MRCP WAW IC                            PROCEDURE DATE:  02/17/2020            INTERPRETATION:  CLINICAL INFORMATION: Elevated LFTs.    COMPARISON: CT same day    PROCEDURE:   MRI of the abdomen was performed with and without intravenous contrast.  IV Contrast: Gadavist. 10 cc administered, 0 cc discarded.  MRCP was performed.    FINDINGS:    LOWER CHEST: Within normal limits.    LIVER: Within normal limits.   BILE DUCTS: Moderate biliary dilatation. A 2 mm distal CBD calculus.  GALLBLADDER: Cholecystectomy.  SPLEEN: Within normal limits.  PANCREAS: Within normal limits.  ADRENALS: Within normal limits.  KIDNEYS/URETERS: Within normal limits.    VISUALIZED PORTIONS:    BOWEL: Within normal limits.   PERITONEUM: No ascites. Mild free air again noted.  VESSELS: Within normal limits.  RETROPERITONEUM/LYMPH NODES: No lymphadenopathy.    ABDOMINAL WALL: Within normal limits.  BONES: Within normal limits.    IMPRESSION:     Moderate biliary dilatation. A 2 mm distal CBD calculus.          < end of copied text >

## 2020-02-17 NOTE — ED ADULT NURSE NOTE - OBJECTIVE STATEMENT
58 yo female A&OX4 from home  bedside c/o chx and back pressure w/ epigastric pn, n/v x today. Pt had gallbladder removed laparoscopically Thursday, 3 incision sites on abd clean, dressed.  PT denies SOB, dizziness. diarrhea. Pt afebrile denies fever/chills. IV access obtained in RAC via 18G catheter, labs drawn and sent. pt medicated, CT pending.

## 2020-02-17 NOTE — H&P ADULT - HISTORY OF PRESENT ILLNESS
Patient is a 59y old  Female who presents with a chief complaint of abdominal pain, nausea and vomiting after lap cholecystectomy    HPI: 59F w/ history of HTN, GERD c/o epigastric pain since 1/6/2020 with lap cholecystectomy for cholelithiasis on 2/13 now presents with abdominal pain, nausea, vomiting x3. Patient states the pain started on PO#1 across her upper abdomen. She was not passing gas and has only had one bowel movement. The pain continued and was not significantly improved with pain medication. The patient denies any jaundice.    Patient denies fevers/chills, denies lightheadedness/dizziness, denies SOB/chest pain, endorses nausea/vomiting, endorses constipation           --------------------------------------------------------------------------------------------

## 2020-02-18 LAB
ALBUMIN SERPL ELPH-MCNC: 3.6 G/DL — SIGNIFICANT CHANGE UP (ref 3.3–5)
ALP SERPL-CCNC: 251 U/L — HIGH (ref 40–120)
ALT FLD-CCNC: 323 U/L — HIGH (ref 10–45)
ANION GAP SERPL CALC-SCNC: 13 MMOL/L — SIGNIFICANT CHANGE UP (ref 5–17)
AST SERPL-CCNC: 111 U/L — HIGH (ref 10–40)
BILIRUB DIRECT SERPL-MCNC: 0.2 MG/DL — SIGNIFICANT CHANGE UP (ref 0–0.2)
BILIRUB INDIRECT FLD-MCNC: 0.7 MG/DL — SIGNIFICANT CHANGE UP (ref 0.2–1)
BILIRUB SERPL-MCNC: 0.9 MG/DL — SIGNIFICANT CHANGE UP (ref 0.2–1.2)
BUN SERPL-MCNC: 10 MG/DL — SIGNIFICANT CHANGE UP (ref 7–23)
CALCIUM SERPL-MCNC: 10.1 MG/DL — SIGNIFICANT CHANGE UP (ref 8.4–10.5)
CHLORIDE SERPL-SCNC: 103 MMOL/L — SIGNIFICANT CHANGE UP (ref 96–108)
CO2 SERPL-SCNC: 24 MMOL/L — SIGNIFICANT CHANGE UP (ref 22–31)
CREAT SERPL-MCNC: 0.84 MG/DL — SIGNIFICANT CHANGE UP (ref 0.5–1.3)
GLUCOSE SERPL-MCNC: 74 MG/DL — SIGNIFICANT CHANGE UP (ref 70–99)
HCT VFR BLD CALC: 37.9 % — SIGNIFICANT CHANGE UP (ref 34.5–45)
HCV AB S/CO SERPL IA: 0.23 S/CO — SIGNIFICANT CHANGE UP (ref 0–0.99)
HCV AB SERPL-IMP: SIGNIFICANT CHANGE UP
HGB BLD-MCNC: 11.6 G/DL — SIGNIFICANT CHANGE UP (ref 11.5–15.5)
MAGNESIUM SERPL-MCNC: 2 MG/DL — SIGNIFICANT CHANGE UP (ref 1.6–2.6)
MCHC RBC-ENTMCNC: 26.4 PG — LOW (ref 27–34)
MCHC RBC-ENTMCNC: 30.6 GM/DL — LOW (ref 32–36)
MCV RBC AUTO: 86.1 FL — SIGNIFICANT CHANGE UP (ref 80–100)
NRBC # BLD: 0 /100 WBCS — SIGNIFICANT CHANGE UP (ref 0–0)
PHOSPHATE SERPL-MCNC: 3 MG/DL — SIGNIFICANT CHANGE UP (ref 2.5–4.5)
PLATELET # BLD AUTO: 305 K/UL — SIGNIFICANT CHANGE UP (ref 150–400)
POTASSIUM SERPL-MCNC: 3.7 MMOL/L — SIGNIFICANT CHANGE UP (ref 3.5–5.3)
POTASSIUM SERPL-SCNC: 3.7 MMOL/L — SIGNIFICANT CHANGE UP (ref 3.5–5.3)
PROT SERPL-MCNC: 6.8 G/DL — SIGNIFICANT CHANGE UP (ref 6–8.3)
RBC # BLD: 4.4 M/UL — SIGNIFICANT CHANGE UP (ref 3.8–5.2)
RBC # FLD: 15 % — HIGH (ref 10.3–14.5)
SODIUM SERPL-SCNC: 140 MMOL/L — SIGNIFICANT CHANGE UP (ref 135–145)
WBC # BLD: 8.79 K/UL — SIGNIFICANT CHANGE UP (ref 3.8–10.5)
WBC # FLD AUTO: 8.79 K/UL — SIGNIFICANT CHANGE UP (ref 3.8–10.5)

## 2020-02-18 PROCEDURE — 43264 ERCP REMOVE DUCT CALCULI: CPT | Mod: GC

## 2020-02-18 PROCEDURE — 43262 ENDO CHOLANGIOPANCREATOGRAPH: CPT | Mod: GC

## 2020-02-18 RX ORDER — POTASSIUM CHLORIDE 20 MEQ
10 PACKET (EA) ORAL
Refills: 0 | Status: COMPLETED | OUTPATIENT
Start: 2020-02-18 | End: 2020-02-18

## 2020-02-18 RX ORDER — DEXTROSE MONOHYDRATE, SODIUM CHLORIDE, AND POTASSIUM CHLORIDE 50; .745; 4.5 G/1000ML; G/1000ML; G/1000ML
1000 INJECTION, SOLUTION INTRAVENOUS
Refills: 0 | Status: DISCONTINUED | OUTPATIENT
Start: 2020-02-18 | End: 2020-02-19

## 2020-02-18 RX ADMIN — DEXTROSE MONOHYDRATE, SODIUM CHLORIDE, AND POTASSIUM CHLORIDE 75 MILLILITER(S): 50; .745; 4.5 INJECTION, SOLUTION INTRAVENOUS at 17:00

## 2020-02-18 RX ADMIN — PIPERACILLIN AND TAZOBACTAM 25 GRAM(S): 4; .5 INJECTION, POWDER, LYOPHILIZED, FOR SOLUTION INTRAVENOUS at 16:59

## 2020-02-18 RX ADMIN — Medication 100 MILLIEQUIVALENT(S): at 16:59

## 2020-02-18 RX ADMIN — Medication 100 MILLIEQUIVALENT(S): at 22:12

## 2020-02-18 RX ADMIN — PANTOPRAZOLE SODIUM 40 MILLIGRAM(S): 20 TABLET, DELAYED RELEASE ORAL at 05:55

## 2020-02-18 RX ADMIN — PIPERACILLIN AND TAZOBACTAM 25 GRAM(S): 4; .5 INJECTION, POWDER, LYOPHILIZED, FOR SOLUTION INTRAVENOUS at 05:55

## 2020-02-18 RX ADMIN — AMLODIPINE BESYLATE 10 MILLIGRAM(S): 2.5 TABLET ORAL at 05:54

## 2020-02-18 NOTE — PROGRESS NOTE ADULT - ATTENDING COMMENTS
I have seen and examined the patient.  I agree with the surgical resident's note.  The patietn is comfortable - ERCP pending today - patient is NPO  Valeriy Ro contact information [phone]

## 2020-02-18 NOTE — PROGRESS NOTE ADULT - SUBJECTIVE AND OBJECTIVE BOX
Pre-Endoscopy Evaluation      Referring Physician: dr. wynn                                 Procedure: ercp    Indication for Procedure: cbd stone    Pertinent History: 59y old female PMH below, s/p lap cholecystectomy on 2/13 with abdominal pain, elevated liver enzymes with moderate biliary dilatation with distal CBD calculus on MRCP       PAST MEDICAL & SURGICAL HISTORY:  Obesity  Benign endometrial hyperplasia  Hypertension  Osteoporosis  GERD (gastroesophageal reflux disease)  Polyp of corpus uteri  Ovarian cyst, left  Pituitary tumor: h/o last MRI 2016  S/P D&C (status post dilation and curettage): 8/2019  History of left oophorectomy: 2013      PMH of Gastroparesis [ ]  Gastric Surgery [ ]  Gastric Outlet Obstruction [ ]    Allergies:    No Known Allergies    Intolerances:    Latex allergy: [ ] yes [X] no    Medications:MEDICATIONS  (STANDING):  amLODIPine   Tablet 10 milliGRAM(s) Oral daily  calcium carbonate 1250 mG  + Vitamin D (OsCal 500 + D) 1 Tablet(s) Oral daily  enoxaparin Injectable 40 milliGRAM(s) SubCutaneous daily  lactated ringers. 1000 milliLiter(s) (125 mL/Hr) IV Continuous <Continuous>  pantoprazole    Tablet 40 milliGRAM(s) Oral before breakfast  piperacillin/tazobactam IVPB.. 3.375 Gram(s) IV Intermittent every 8 hours    MEDICATIONS  (PRN):  acetaminophen   Tablet .. 975 milliGRAM(s) Oral every 6 hours PRN Mild Pain (1 - 3)      Smoking: [ ] yes  [X] no    AICD/PPM: [ ] yes   [x] no    Pertinent lab data:                        11.6   8.79  )-----------( 305      ( 18 Feb 2020 07:14 )             37.9     02-18    140  |  103  |  10  ----------------------------<  74  3.7   |  24  |  0.84    Ca    10.1      18 Feb 2020 07:03  Phos  3.0     02-18  Mg     2.0     02-18    TPro  6.8  /  Alb  3.6  /  TBili  0.9  /  DBili  0.2  /  AST  111<H>  /  ALT  323<H>  /  AlkPhos  251<H>  02-18    PT/INR - ( 17 Feb 2020 00:34 )   PT: 12.5 sec;   INR: 1.08 ratio         PTT - ( 17 Feb 2020 00:34 )  PTT:28.0 sec        Physical Examination:    Daily   Vital Signs Last 24 Hrs  T(C): 36.9 (18 Feb 2020 09:26), Max: 37.3 (17 Feb 2020 21:01)  T(F): 98.4 (18 Feb 2020 09:26), Max: 99.2 (18 Feb 2020 00:47)  HR: 79 (18 Feb 2020 09:26) (79 - 86)  BP: 124/86 (18 Feb 2020 09:26) (124/86 - 150/78)  BP(mean): --  RR: 18 (18 Feb 2020 09:26) (18 - 18)  SpO2: 97% (18 Feb 2020 09:26) (93% - 97%)    Drug Dosing Weight  Height (cm): 157.48 (16 Feb 2020 21:33)  Weight (kg): 95.3 (16 Feb 2020 21:33)  BMI (kg/m2): 38.4 (16 Feb 2020 21:33)  BSA (m2): 1.95 (16 Feb 2020 21:33)    Constitutional: NAD     Neck:  No JVD    Respiratory: CTAB/L    Cardiovascular: S1 and S2    Gastrointestinal: BS+, soft, NT/ND    Extremities: No peripheral edema    Neurological: A/O x 3    : No [place]    Skin: No rashes    Comments:    The patient is a suitable candidate for the planned procedure unless box checked [ ]  No, explain:

## 2020-02-18 NOTE — PROGRESS NOTE ADULT - SUBJECTIVE AND OBJECTIVE BOX
SURGERY DAILY PROGRESS NOTE:     SUBJECTIVE/24hr Events:     Patient seen and examined on am rounds. MRCP yesterday w/ 2mm stone in CBD. No acute events overnight. This am pain well controlled. Denies n/v. Afebrile. VSS.      OBJECTIVE:    MEDICATIONS  (STANDING):  amLODIPine   Tablet 10 milliGRAM(s) Oral daily  calcium carbonate 1250 mG  + Vitamin D (OsCal 500 + D) 1 Tablet(s) Oral daily  enoxaparin Injectable 40 milliGRAM(s) SubCutaneous daily  lactated ringers. 1000 milliLiter(s) (125 mL/Hr) IV Continuous <Continuous>  pantoprazole    Tablet 40 milliGRAM(s) Oral before breakfast  piperacillin/tazobactam IVPB.. 3.375 Gram(s) IV Intermittent every 8 hours    MEDICATIONS  (PRN):  acetaminophen   Tablet .. 975 milliGRAM(s) Oral every 6 hours PRN Mild Pain (1 - 3)      Vital Signs Last 24 Hrs  T(C): 37.3 (18 Feb 2020 00:47), Max: 37.3 (17 Feb 2020 21:01)  T(F): 99.2 (18 Feb 2020 00:47), Max: 99.2 (18 Feb 2020 00:47)  HR: 83 (18 Feb 2020 00:47) (73 - 86)  BP: 150/78 (18 Feb 2020 00:47) (125/76 - 150/78)  BP(mean): --  RR: 18 (18 Feb 2020 00:47) (18 - 18)  SpO2: 94% (18 Feb 2020 00:47) (94% - 97%)      I&O's Detail    16 Feb 2020 07:01  -  17 Feb 2020 07:00  --------------------------------------------------------  IN:    lactated ringers.: 375 mL  Total IN: 375 mL    OUT:  Total OUT: 0 mL    Total NET: 375 mL      17 Feb 2020 07:01  -  18 Feb 2020 05:06  --------------------------------------------------------  IN:    Solution: 300 mL    Solution: 200 mL  Total IN: 500 mL    OUT:    Voided: 1825 mL  Total OUT: 1825 mL    Total NET: -1325 mL          LABS:                        11.2   9.89  )-----------( 318      ( 17 Feb 2020 07:18 )             36.4     02-17    139  |  103  |  8   ----------------------------<  99  3.3<L>   |  27  |  0.80    Ca    10.1      17 Feb 2020 07:17  Phos  2.9     02-17  Mg     2.2     02-17    TPro  6.4  /  Alb  3.5  /  TBili  1.7<H>  /  DBili  x   /  AST  358<H>  /  ALT  508<H>  /  AlkPhos  301<H>  02-17    PT/INR - ( 17 Feb 2020 00:34 )   PT: 12.5 sec;   INR: 1.08 ratio         PTT - ( 17 Feb 2020 00:34 )  PTT:28.0 sec          PHYSICAL EXAM:  Constitutional: well developed, well nourished, NAD  ENMT: normal facies, symmetric  Respiratory: Normal respiratory effort   Abdomen: Soft, mild epigastric TTP. Non distended. No rebound or guarding. Incisions c/d/i, healing.

## 2020-02-18 NOTE — PROGRESS NOTE ADULT - ASSESSMENT
59y old f with dilated biliary ducts and elevated bilirubin and liver enzymes POD4 from laparoscopic cholecystectomy    - NPO/IVF for tentative ERCP w/ GI today   - c/w Zosyn  - f/u am labs    - DVT ppx  - Monitor vitals    [facility]   [id number]

## 2020-02-19 ENCOUNTER — TRANSCRIPTION ENCOUNTER (OUTPATIENT)
Age: 60
End: 2020-02-19

## 2020-02-19 VITALS
OXYGEN SATURATION: 97 % | RESPIRATION RATE: 18 BRPM | DIASTOLIC BLOOD PRESSURE: 73 MMHG | TEMPERATURE: 98 F | HEART RATE: 88 BPM | SYSTOLIC BLOOD PRESSURE: 116 MMHG

## 2020-02-19 LAB
ALBUMIN SERPL ELPH-MCNC: 3.6 G/DL — SIGNIFICANT CHANGE UP (ref 3.3–5)
ALP SERPL-CCNC: 294 U/L — HIGH (ref 40–120)
ALT FLD-CCNC: 251 U/L — HIGH (ref 10–45)
AMYLASE P1 CFR SERPL: 80 U/L — SIGNIFICANT CHANGE UP (ref 25–125)
ANION GAP SERPL CALC-SCNC: 11 MMOL/L — SIGNIFICANT CHANGE UP (ref 5–17)
AST SERPL-CCNC: 63 U/L — HIGH (ref 10–40)
BILIRUB DIRECT SERPL-MCNC: 0.2 MG/DL — SIGNIFICANT CHANGE UP (ref 0–0.2)
BILIRUB INDIRECT FLD-MCNC: 0.4 MG/DL — SIGNIFICANT CHANGE UP (ref 0.2–1)
BILIRUB SERPL-MCNC: 0.6 MG/DL — SIGNIFICANT CHANGE UP (ref 0.2–1.2)
BUN SERPL-MCNC: 8 MG/DL — SIGNIFICANT CHANGE UP (ref 7–23)
CALCIUM SERPL-MCNC: 10.5 MG/DL — SIGNIFICANT CHANGE UP (ref 8.4–10.5)
CHLORIDE SERPL-SCNC: 106 MMOL/L — SIGNIFICANT CHANGE UP (ref 96–108)
CO2 SERPL-SCNC: 24 MMOL/L — SIGNIFICANT CHANGE UP (ref 22–31)
CREAT SERPL-MCNC: 0.76 MG/DL — SIGNIFICANT CHANGE UP (ref 0.5–1.3)
GLUCOSE SERPL-MCNC: 132 MG/DL — HIGH (ref 70–99)
HCT VFR BLD CALC: 38.5 % — SIGNIFICANT CHANGE UP (ref 34.5–45)
HGB BLD-MCNC: 11.8 G/DL — SIGNIFICANT CHANGE UP (ref 11.5–15.5)
LIDOCAIN IGE QN: 195 U/L — HIGH (ref 7–60)
MAGNESIUM SERPL-MCNC: 2.2 MG/DL — SIGNIFICANT CHANGE UP (ref 1.6–2.6)
MCHC RBC-ENTMCNC: 26.3 PG — LOW (ref 27–34)
MCHC RBC-ENTMCNC: 30.6 GM/DL — LOW (ref 32–36)
MCV RBC AUTO: 85.9 FL — SIGNIFICANT CHANGE UP (ref 80–100)
NRBC # BLD: 0 /100 WBCS — SIGNIFICANT CHANGE UP (ref 0–0)
PHOSPHATE SERPL-MCNC: 2.3 MG/DL — LOW (ref 2.5–4.5)
PLATELET # BLD AUTO: 326 K/UL — SIGNIFICANT CHANGE UP (ref 150–400)
POTASSIUM SERPL-MCNC: 4.5 MMOL/L — SIGNIFICANT CHANGE UP (ref 3.5–5.3)
POTASSIUM SERPL-SCNC: 4.5 MMOL/L — SIGNIFICANT CHANGE UP (ref 3.5–5.3)
PROT SERPL-MCNC: 7.2 G/DL — SIGNIFICANT CHANGE UP (ref 6–8.3)
RBC # BLD: 4.48 M/UL — SIGNIFICANT CHANGE UP (ref 3.8–5.2)
RBC # FLD: 14.4 % — SIGNIFICANT CHANGE UP (ref 10.3–14.5)
SODIUM SERPL-SCNC: 141 MMOL/L — SIGNIFICANT CHANGE UP (ref 135–145)
WBC # BLD: 11.02 K/UL — HIGH (ref 3.8–10.5)
WBC # FLD AUTO: 11.02 K/UL — HIGH (ref 3.8–10.5)

## 2020-02-19 PROCEDURE — 87040 BLOOD CULTURE FOR BACTERIA: CPT

## 2020-02-19 PROCEDURE — 80076 HEPATIC FUNCTION PANEL: CPT

## 2020-02-19 PROCEDURE — 99284 EMERGENCY DEPT VISIT MOD MDM: CPT | Mod: 25

## 2020-02-19 PROCEDURE — 74177 CT ABD & PELVIS W/CONTRAST: CPT

## 2020-02-19 PROCEDURE — 82330 ASSAY OF CALCIUM: CPT

## 2020-02-19 PROCEDURE — 74330 X-RAY BILE/PANC ENDOSCOPY: CPT

## 2020-02-19 PROCEDURE — 86850 RBC ANTIBODY SCREEN: CPT

## 2020-02-19 PROCEDURE — 86901 BLOOD TYPING SEROLOGIC RH(D): CPT

## 2020-02-19 PROCEDURE — 82947 ASSAY GLUCOSE BLOOD QUANT: CPT

## 2020-02-19 PROCEDURE — 84132 ASSAY OF SERUM POTASSIUM: CPT

## 2020-02-19 PROCEDURE — A9585: CPT

## 2020-02-19 PROCEDURE — 82435 ASSAY OF BLOOD CHLORIDE: CPT

## 2020-02-19 PROCEDURE — 99238 HOSP IP/OBS DSCHRG MGMT 30/<: CPT

## 2020-02-19 PROCEDURE — 85027 COMPLETE CBC AUTOMATED: CPT

## 2020-02-19 PROCEDURE — 83735 ASSAY OF MAGNESIUM: CPT

## 2020-02-19 PROCEDURE — 74183 MRI ABD W/O CNTR FLWD CNTR: CPT

## 2020-02-19 PROCEDURE — 80053 COMPREHEN METABOLIC PANEL: CPT

## 2020-02-19 PROCEDURE — 84100 ASSAY OF PHOSPHORUS: CPT

## 2020-02-19 PROCEDURE — 80048 BASIC METABOLIC PNL TOTAL CA: CPT

## 2020-02-19 PROCEDURE — 84295 ASSAY OF SERUM SODIUM: CPT

## 2020-02-19 PROCEDURE — C1769: CPT

## 2020-02-19 PROCEDURE — 85730 THROMBOPLASTIN TIME PARTIAL: CPT

## 2020-02-19 PROCEDURE — 96374 THER/PROPH/DIAG INJ IV PUSH: CPT | Mod: XU

## 2020-02-19 PROCEDURE — 83605 ASSAY OF LACTIC ACID: CPT

## 2020-02-19 PROCEDURE — 96375 TX/PRO/DX INJ NEW DRUG ADDON: CPT | Mod: XU

## 2020-02-19 PROCEDURE — 86803 HEPATITIS C AB TEST: CPT

## 2020-02-19 PROCEDURE — 83690 ASSAY OF LIPASE: CPT

## 2020-02-19 PROCEDURE — 82803 BLOOD GASES ANY COMBINATION: CPT

## 2020-02-19 PROCEDURE — 85014 HEMATOCRIT: CPT

## 2020-02-19 PROCEDURE — C1889: CPT

## 2020-02-19 PROCEDURE — 86900 BLOOD TYPING SEROLOGIC ABO: CPT

## 2020-02-19 PROCEDURE — 82150 ASSAY OF AMYLASE: CPT

## 2020-02-19 PROCEDURE — 85610 PROTHROMBIN TIME: CPT

## 2020-02-19 RX ORDER — ACETAMINOPHEN 500 MG
3 TABLET ORAL
Qty: 0 | Refills: 0 | DISCHARGE
Start: 2020-02-19

## 2020-02-19 RX ORDER — SODIUM,POTASSIUM PHOSPHATES 278-250MG
2 POWDER IN PACKET (EA) ORAL ONCE
Refills: 0 | Status: COMPLETED | OUTPATIENT
Start: 2020-02-19 | End: 2020-02-19

## 2020-02-19 RX ORDER — BENZOCAINE AND MENTHOL 5; 1 G/100ML; G/100ML
1 LIQUID ORAL
Refills: 0 | Status: DISCONTINUED | OUTPATIENT
Start: 2020-02-19 | End: 2020-02-19

## 2020-02-19 RX ADMIN — Medication 1 TABLET(S): at 13:01

## 2020-02-19 RX ADMIN — AMLODIPINE BESYLATE 10 MILLIGRAM(S): 2.5 TABLET ORAL at 05:24

## 2020-02-19 RX ADMIN — Medication 2 PACKET(S): at 13:01

## 2020-02-19 RX ADMIN — PANTOPRAZOLE SODIUM 40 MILLIGRAM(S): 20 TABLET, DELAYED RELEASE ORAL at 05:23

## 2020-02-19 RX ADMIN — Medication 100 MILLIEQUIVALENT(S): at 00:00

## 2020-02-19 RX ADMIN — ENOXAPARIN SODIUM 40 MILLIGRAM(S): 100 INJECTION SUBCUTANEOUS at 13:01

## 2020-02-19 NOTE — DISCHARGE NOTE PROVIDER - NSDCMRMEDTOKEN_GEN_ALL_CORE_FT
acetaminophen 325 mg oral tablet: 3 tab(s) orally every 6 hours, As needed, Mild Pain (1 - 3)  amLODIPine 10 mg oral tablet: 1 tab(s) orally once a day  Calcium 500+D: 1  orally once a day  omeprazole 20 mg oral delayed release capsule: 1 cap(s) orally every other day  oxyCODONE 5 mg oral tablet: 1 tab(s) orally every 4 hours, As Needed -Moderate Pain (4 - 6) MDD:8  Vitamin D3 1000 intl units (25 mcg) oral tablet: 1 tab(s) orally once a day

## 2020-02-19 NOTE — PROGRESS NOTE ADULT - REASON FOR ADMISSION
dilated biliary ducts s/p cholecystectomy

## 2020-02-19 NOTE — PROGRESS NOTE ADULT - SUBJECTIVE AND OBJECTIVE BOX
Chief Complaint:  Patient is a 59y old  Female who presents with a chief complaint of dilated biliary ducts s/p cholecystectomy (19 Feb 2020 02:21)      Interval Events:   - patient had an ERCP done yesterday with no complications  - she denies nausea, vomiting, abdominal pain     Allergies:  No Known Allergies      Hospital Medications:  acetaminophen   Tablet .. 975 milliGRAM(s) Oral every 6 hours PRN  amLODIPine   Tablet 10 milliGRAM(s) Oral daily  calcium carbonate 1250 mG  + Vitamin D (OsCal 500 + D) 1 Tablet(s) Oral daily  enoxaparin Injectable 40 milliGRAM(s) SubCutaneous daily  pantoprazole    Tablet 40 milliGRAM(s) Oral before breakfast      PMHX/PSHX:  Obesity  Benign endometrial hyperplasia  Hypertension  Morbidly obese  Osteoporosis  Obesity  GERD (gastroesophageal reflux disease)  Polyp of corpus uteri  Ovarian cyst, left  Pituitary tumor  S/P D&C (status post dilation and curettage)  History of left oophorectomy      Family history:  Family history of thyroid cancer  Family history of Alzheimer's disease      ROS:     General:  No wt loss, fevers, chills, night sweats, fatigue,   Eyes:  Good vision, no reported pain  ENT:  No sore throat, pain, runny nose, dysphagia  CV:  No pain, palpitations, hypo/hypertension  Resp:  No dyspnea, cough, tachypnea, wheezing  GI:  See HPI  :  No pain, bleeding, incontinence, nocturia  Muscle:  No pain, weakness  Neuro:  No weakness, tingling, memory problems  Psych:  No fatigue, insomnia, mood problems, depression  Endocrine:  No polyuria, polydipsia, cold/heat intolerance  Heme:  No petechiae, ecchymosis, easy bruisability  Skin:  No rash, edema      PHYSICAL EXAM:     GENERAL:  Appears stated age  HEENT:  NC/AT  CHEST:  Full & symmetric excursion  HEART:  Regular rhythm, S1, S2  ABDOMEN:  Soft, non-tender, non-distended  EXTREMITIES:  no edema  SKIN:  No rash  NEURO:  Alert, oriented    Vital Signs:  Vital Signs Last 24 Hrs  T(C): 36.6 (19 Feb 2020 05:37), Max: 37.2 (18 Feb 2020 20:51)  T(F): 97.9 (19 Feb 2020 05:37), Max: 98.9 (18 Feb 2020 20:51)  HR: 71 (19 Feb 2020 05:37) (71 - 94)  BP: 136/82 (19 Feb 2020 05:37) (120/78 - 143/80)  BP(mean): --  RR: 18 (19 Feb 2020 05:37) (18 - 18)  SpO2: 98% (19 Feb 2020 05:37) (94% - 98%)  Daily     Daily     LABS:                        11.8   11.02 )-----------( 326      ( 19 Feb 2020 06:49 )             38.5     02-18    140  |  103  |  10  ----------------------------<  74  3.7   |  24  |  0.84    Ca    10.1      18 Feb 2020 07:03  Phos  3.0     02-18  Mg     2.0     02-18    TPro  6.8  /  Alb  3.6  /  TBili  0.9  /  DBili  0.2  /  AST  111<H>  /  ALT  323<H>  /  AlkPhos  251<H>  02-18    LIVER FUNCTIONS - ( 18 Feb 2020 07:03 )  Alb: 3.6 g/dL / Pro: 6.8 g/dL / ALK PHOS: 251 U/L / ALT: 323 U/L / AST: 111 U/L / GGT: x             Imaging:  < from: ERCP (02.18.20 @ 12:10) >  Impression:          - Choledocholithiasis status post biliary sphincterotomy, balloon sweep,                        removal of small stone fragments.  Recommendation:      - Return patient to hospital [facility] for ongoing care.                       - Clear liquid diet as tolerated today.   - Monitor CBC, LFTs.    < end of copied text > Chief Complaint:  Patient is a 59y old  Female who presents with a chief complaint of dilated biliary ducts s/p cholecystectomy (19 Feb 2020 02:21)      Interval Events:   - patient had an ERCP done yesterday with no complications  - she denies nausea, vomiting, abdominal pain     Allergies:  No Known Allergies      Hospital Medications:  acetaminophen   Tablet .. 975 milliGRAM(s) Oral every 6 hours PRN  amLODIPine   Tablet 10 milliGRAM(s) Oral daily  calcium carbonate 1250 mG  + Vitamin D (OsCal 500 + D) 1 Tablet(s) Oral daily  enoxaparin Injectable 40 milliGRAM(s) SubCutaneous daily  pantoprazole    Tablet 40 milliGRAM(s) Oral before breakfast      PMHX/PSHX:  Obesity  Benign endometrial hyperplasia  Hypertension  Morbidly obese  Osteoporosis  Obesity  GERD (gastroesophageal reflux disease)  Polyp of corpus uteri  Ovarian cyst, left  Pituitary tumor  S/P D&C (status post dilation and curettage)  History of left oophorectomy      Family history:  Family history of thyroid cancer  Family history of Alzheimer's disease      ROS:     General:  No wt loss, fevers, chills, night sweats, fatigue,   Eyes:  Good vision, no reported pain  ENT:  No sore throat, pain, runny nose, dysphagia  CV:  No pain, palpitations, hypo/hypertension  Resp:  No dyspnea, cough, tachypnea, wheezing  GI:  See HPI  :  No pain, bleeding, incontinence, nocturia  Muscle:  No pain, weakness  Neuro:  No weakness, tingling, memory problems  Psych:  No fatigue, insomnia, mood problems, depression  Endocrine:  No polyuria, polydipsia, cold/heat intolerance  Heme:  No petechiae, ecchymosis, easy bruisability  Skin:  No rash, edema      PHYSICAL EXAM:     GENERAL:  Appears stated age  HEENT:  NC/AT  CHEST:  Full & symmetric excursion  HEART:  Regular rhythm, S1, S2  ABDOMEN:  Soft, non-tender, non-distended  EXTREMITIES:  no edema  SKIN:  No rash  NEURO:  Alert, oriented    Vital Signs:  Vital Signs Last 24 Hrs  T(C): 36.6 (19 Feb 2020 05:37), Max: 37.2 (18 Feb 2020 20:51)  T(F): 97.9 (19 Feb 2020 05:37), Max: 98.9 (18 Feb 2020 20:51)  HR: 71 (19 Feb 2020 05:37) (71 - 94)  BP: 136/82 (19 Feb 2020 05:37) (120/78 - 143/80)  BP(mean): --  RR: 18 (19 Feb 2020 05:37) (18 - 18)  SpO2: 98% (19 Feb 2020 05:37) (94% - 98%)  Daily     Daily     LABS:                        11.8   11.02 )-----------( 326      ( 19 Feb 2020 06:49 )             38.5     02-18    140  |  103  |  10  ----------------------------<  74  3.7   |  24  |  0.84    Ca    10.1      18 Feb 2020 07:03  Phos  3.0     02-18  Mg     2.0     02-18    TPro  6.8  /  Alb  3.6  /  TBili  0.9  /  DBili  0.2  /  AST  111<H>  /  ALT  323<H>  /  AlkPhos  251<H>  02-18    LIVER FUNCTIONS - ( 18 Feb 2020 07:03 )  Alb: 3.6 g/dL / Pro: 6.8 g/dL / ALK PHOS: 251 U/L / ALT: 323 U/L / AST: 111 U/L / GGT: x             Imaging:  < from: ERCP (02.18.20 @ 12:10) >  Impression:            - Choledocholithiasis status post biliary sphincterotomy, balloon sweep, removal of small stone fragments.    Recommendation:        - Return patient to hospital [facility] for ongoing care.  - Clear liquid diet as tolerated today.  - Monitor CBC, LFTs.    < end of copied text >

## 2020-02-19 NOTE — DISCHARGE NOTE PROVIDER - NSDCFUADDAPPT_GEN_ALL_CORE_FT
Patient can follow up with GI as an outpatient on discharge ([facility]: [phone], [facility]: [phone], Attending Office: [phone])

## 2020-02-19 NOTE — PROGRESS NOTE ADULT - ATTENDING COMMENTS
I saw and examined the pt and discussed the tx plan with the House Staff. I agree with the exam and plan as documented in the surgery resident's note from today.  Feels well, abdomen benign.  Trend LFTs.  Advance diet if OK with GI.  Rosio Olivia MD

## 2020-02-19 NOTE — DISCHARGE NOTE PROVIDER - CARE PROVIDER_API CALL
Rosio Olivia)  ColonRectal Surgery; Surgery  [address], [address]  [place] [address]  Phone: [phone]  Fax: [phone]  Follow Up Time:     Vaibhav Guzman)  Gastroenterology  [address] [place] [address]  Phone: [phone]  Fax: [phone]  Follow Up Time:

## 2020-02-19 NOTE — PROGRESS NOTE ADULT - ASSESSMENT
59 year old female with HTN, GERD s/p lap cholecystectomy for cholelithiasis on 2/13 by dr. Olivia now presents with abdominal pain, nausea, vomiting x3.     IMPRESSION  - Choledocholithiasis s/p ERCP on 2.18.2020 with biliary sphincterotomy, balloon sweep, removal of small stone fragments  - Cholelithiasis, s/p lap cholecystectomy for cholelithiasis on 2/13    RECOMMENDATION     - trend CBC, CMP  - advance diet as tolerated  - rest of care per primary team  - patient can follow up with GI as an outpatient on discharge ([facility]: [phone], [facility]: [phone], Attending Office: [phone])    Please call us back as needed    Dean Thibodeaux, PGY-6  GI fellow  B- [phone]/ [phone]  Please call GI fellow on call after 5pm and on weekends 59 year old female with HTN, GERD s/p lap cholecystectomy for cholelithiasis on 2/13 by dr. Olivia now presents with abdominal pain, nausea, vomiting x3.     IMPRESSION  - Choledocholithiasis s/p ERCP on 2.18.2020 with biliary sphincterotomy, balloon sweep, removal of small stone fragments  - Cholelithiasis, s/p lap cholecystectomy for cholelithiasis on 2/13    RECOMMENDATION     - trend CBC, CMP  - advance diet as tolerated  - rest of care per primary team  - okay to discharge from a GI standpoint, patient can follow up with GI as an outpatient on discharge ([facility]: [phone], [facility]: [phone], Attending Office: [phone])    Please call us back as needed    Dean Thibodeaux, PGY-6  GI fellow  B- [phone]/ [phone]  Please call GI fellow on call after 5pm and on weekends

## 2020-02-19 NOTE — DISCHARGE NOTE NURSING/CASE MANAGEMENT/SOCIAL WORK - NSDCFUADDAPPT_GEN_ALL_CORE_FT
Patient can follow up with GI as an outpatient on discharge ([facility]: [phone], [facility]: [phone], Attending Office: [phone])

## 2020-02-19 NOTE — DISCHARGE NOTE PROVIDER - CARE PROVIDERS DIRECT ADDRESSES
[email or web address]

## 2020-02-19 NOTE — PROGRESS NOTE ADULT - ASSESSMENT
59y old f with dilated biliary ducts and elevated bilirubin and liver enzymes s/p laparoscopic cholecystectomy on 2/13 and s/p ERCP 2/18 with sphincterotomy     - CLD advance as tolerated  - trend LFT's  - f/u am labs    - DVT ppx  - Monitor vitals    [facility]   [id number]

## 2020-02-19 NOTE — DISCHARGE NOTE PROVIDER - NSDCCPCAREPLAN_GEN_ALL_CORE_FT
PRINCIPAL DISCHARGE DIAGNOSIS  Diagnosis: Biliary obstruction  Assessment and Plan of Treatment: NOTIFY YOUR SURGEON IF:  Any fever (over 100.4 F) or chills, persistent nausea/vomiting with inability to tolerate food or liquids, persistent diarrhea, or if your abdominal pain returns or worsens.   FOLLOW-UP:  1. Please call to make a follow-up appointment in 1-2 weeks upon discharge from the hospital with Dr. Nicole  2. Please follow up with your primary care physician in one week regarding your hospitalization.

## 2020-02-19 NOTE — DISCHARGE NOTE PROVIDER - HOSPITAL COURSE
59 y.o. female s/p cholecystectomy 2/13/20 returned with abdominal pain, n/v- found to have a 2mm CBD stone on MRCP.  GI evaluated the patient and underwent an ERCP with a balloon sweep of stone fragments and sphincterotomy. The patient tolerated the procedure well (see ERCP report for full details). Diet was advanced as tolerated.  Abdominal pain resolved.  On day of discharge, the patient was tolerating diet, ambulating well and pain controlled. She will follow up with Dr. Nicole in 1 week.

## 2020-02-19 NOTE — DISCHARGE NOTE NURSING/CASE MANAGEMENT/SOCIAL WORK - PATIENT PORTAL LINK FT
You can access the FollowMyHealth Patient Portal offered by [facility] by registering at the following website: [email or web address]. By joining [employer]’s FollowMyHealth portal, you will also be able to view your health information using other applications (apps) compatible with our system.

## 2020-02-19 NOTE — PROGRESS NOTE ADULT - SUBJECTIVE AND OBJECTIVE BOX
SURGERY DAILY PROGRESS NOTE:       SUBJECTIVE/ROS: Patient examined at bedside. No acute events overnight. Patient receive ERCP showing choledocholithiasis sphincterotomy was [performed and stone fragments were removed   Denies nausea, vomiting, chest pain, shortness of breath         MEDICATIONS  (STANDING):  amLODIPine   Tablet 10 milliGRAM(s) Oral daily  calcium carbonate 1250 mG  + Vitamin D (OsCal 500 + D) 1 Tablet(s) Oral daily  dextrose 5% + sodium chloride 0.45% with potassium chloride 20 mEq/L 1000 milliLiter(s) (75 mL/Hr) IV Continuous <Continuous>  enoxaparin Injectable 40 milliGRAM(s) SubCutaneous daily  pantoprazole    Tablet 40 milliGRAM(s) Oral before breakfast    MEDICATIONS  (PRN):  acetaminophen   Tablet .. 975 milliGRAM(s) Oral every 6 hours PRN Mild Pain (1 - 3)      OBJECTIVE:    Vital Signs Last 24 Hrs  T(C): 36.6 (19 Feb 2020 01:11), Max: 37.2 (18 Feb 2020 20:51)  T(F): 97.8 (19 Feb 2020 01:11), Max: 98.9 (18 Feb 2020 20:51)  HR: 73 (19 Feb 2020 01:11) (73 - 94)  BP: 120/78 (19 Feb 2020 01:11) (120/78 - 143/80)  BP(mean): --  RR: 18 (19 Feb 2020 01:11) (18 - 18)  SpO2: 94% (19 Feb 2020 01:11) (93% - 97%)        I&O's Detail    17 Feb 2020 07:01  -  18 Feb 2020 07:00  --------------------------------------------------------  IN:    Solution: 200 mL    Solution: 400 mL  Total IN: 600 mL    OUT:    Voided: 3325 mL  Total OUT: 3325 mL    Total NET: -2725 mL      18 Feb 2020 07:01  -  19 Feb 2020 02:21  --------------------------------------------------------  IN:    dextrose 5% + sodium chloride 0.45% with potassium chloride 20 mEq/L: 900 mL    Oral Fluid: 880 mL  Total IN: 1780 mL    OUT:  Total OUT: 0 mL    Total NET: 1780 mL          Daily     Daily     LABS:                        11.6   8.79  )-----------( 305      ( 18 Feb 2020 07:14 )             37.9     02-18    140  |  103  |  10  ----------------------------<  74  3.7   |  24  |  0.84    Ca    10.1      18 Feb 2020 07:03  Phos  3.0     02-18  Mg     2.0     02-18    TPro  6.8  /  Alb  3.6  /  TBili  0.9  /  DBili  0.2  /  AST  111<H>  /  ALT  323<H>  /  AlkPhos  251<H>  02-18                      PHYSICAL EXAM:  Constitutional: well developed, well nourished, NAD  Eyes: anicteric  ENMT: normal facies, symmetric  Respiratory: Breathing comfortably    Gastrointestinal: abdomen soft, nontender, nondistended. incisions c/d/i  Extremities: FROM, warm  Neurological: intact, non-focal  Psychiatric: oriented x 3; appropriate

## 2020-02-23 LAB
CULTURE RESULTS: SIGNIFICANT CHANGE UP
CULTURE RESULTS: SIGNIFICANT CHANGE UP
SPECIMEN SOURCE: SIGNIFICANT CHANGE UP
SPECIMEN SOURCE: SIGNIFICANT CHANGE UP

## 2020-02-27 LAB — SURGICAL PATHOLOGY STUDY: SIGNIFICANT CHANGE UP

## 2020-03-04 ENCOUNTER — APPOINTMENT (OUTPATIENT)
Dept: SURGERY | Facility: CLINIC | Age: 60
End: 2020-03-04
Payer: COMMERCIAL

## 2020-03-04 VITALS
RESPIRATION RATE: 19 BRPM | SYSTOLIC BLOOD PRESSURE: 136 MMHG | TEMPERATURE: 98.4 F | HEART RATE: 97 BPM | OXYGEN SATURATION: 99 % | DIASTOLIC BLOOD PRESSURE: 79 MMHG

## 2020-03-04 DIAGNOSIS — K80.20 CALCULUS OF GALLBLADDER W/OUT CHOLECYSTITIS W/OUT OBSTRUCTION: ICD-10-CM

## 2020-03-04 DIAGNOSIS — R94.5 ABNORMAL RESULTS OF LIVER FUNCTION STUDIES: ICD-10-CM

## 2020-03-04 DIAGNOSIS — K80.50 CALCULUS OF BILE DUCT W/OUT CHOLANGITIS OR CHOLECYSTITIS W/OUT OBSTRUCTION: ICD-10-CM

## 2020-03-04 PROCEDURE — 99024 POSTOP FOLLOW-UP VISIT: CPT

## 2020-03-06 PROBLEM — K80.50 CHOLEDOCHOLITHIASIS: Status: RESOLVED | Noted: 2020-03-06 | Resolved: 2020-03-06

## 2020-03-06 PROBLEM — K80.20 SYMPTOMATIC CHOLELITHIASIS: Status: RESOLVED | Noted: 2020-01-21 | Resolved: 2020-03-06

## 2020-03-06 NOTE — HISTORY OF PRESENT ILLNESS
[FreeTextEntry1] : Justus Loredo is a 59 yr old female S/P lap cholecystectomy 2/13/20. Pt  returned to the ED on 02/17/20 with abdominal pain, n/v, elevated LFTs -found to have choledocholithiasis on MRCP. Underwent ERCP with sphincterotomy and stone removal. \par Today pt reports feeling well. Minimal discomfort. No wound problems. Denies fever, chills or N/V. Takes Advil PRN. Reports daily soft BM's.

## 2020-03-06 NOTE — CONSULT LETTER
[Dear  ___] : Dear ~KEITH, [Courtesy Letter:] : I had the pleasure of seeing your patient, [unfilled], in my office today. [Please see my note below.] : Please see my note below. [Consult Closing:] : Thank you very much for allowing me to participate in the care of this patient.  If you have any questions, please do not hesitate to contact me. [Sincerely,] : Sincerely, [FreeTextEntry2] : VERONICA Duval  [FreeTextEntry3] : Rosio Olivia M.D., F.A.C.S., F.RUEL.S.C.R.S.\par Assistant Professor of Surgery\par [employer] [facility]\par \par

## 2020-03-06 NOTE — PHYSICAL EXAM
[FreeTextEntry1] : Abdomen soft, non-tender, non-distended. Port incisions healing well. No hernias.

## 2020-03-06 NOTE — ASSESSMENT
[FreeTextEntry1] : Doing well postop.\par Path discussed. \par Instructions for diet, activity, wound care given, all questions answered.\par Script given for f/u on LFTs. I will call the pt with results. \par RTO as needed.\par \par

## 2020-03-10 LAB
ALBUMIN SERPL ELPH-MCNC: 3.9 G/DL
ALP BLD-CCNC: 128 U/L
ALT SERPL-CCNC: 17 U/L
ANION GAP SERPL CALC-SCNC: 12 MMOL/L
AST SERPL-CCNC: 12 U/L
BILIRUB SERPL-MCNC: 0.4 MG/DL
BUN SERPL-MCNC: 11 MG/DL
CALCIUM SERPL-MCNC: 10.2 MG/DL
CHLORIDE SERPL-SCNC: 106 MMOL/L
CO2 SERPL-SCNC: 25 MMOL/L
CREAT SERPL-MCNC: 0.8 MG/DL
GLUCOSE SERPL-MCNC: 121 MG/DL
POTASSIUM SERPL-SCNC: 4.1 MMOL/L
PROT SERPL-MCNC: 6.4 G/DL
SODIUM SERPL-SCNC: 142 MMOL/L

## 2020-05-15 NOTE — ED ADULT NURSE NOTE - NSFALLRSKASSESASSIST_ED_ALL_ED
SW following for support and assistance with discharge planning.     Notified by RN/CM and by KAMALA Chatterjee that pt may be ready for discharge early next week. Medical team recommending that pt have at least 8 hrs daily caregiver support and not the 4 hours that has previously been set up by his .   They have spoken to pt and asked him to contact his spouse re: need for additional caregiver hours.     Contacted  and spoke to him to offer support/resources. Manoj has spoken to his  and is aware that pt will likely need a full day of caregiver services upon discharge.  Manoj reported that he has contacted [facility] to request the additional hours and believes this will not be an issue.  States [facility]      Additionally, Manoj stated he is concerned because he hasn't been able to see pt so really doesn't understand what condition pt is in.  Also states that their friend, Juan, will be also able to help provide some care when needed.     Contacted Mona at [facility] [phone] and left message re: pt's anticipated discharge date and also to confirm ability to provide additional caregiver hours.     Plan:  SW/CM to to continue to assist pt as needed.   Manoj (and possibly Juan) may benefit from some caregiver training before pt is discharged (if possible)- will request this to Nita.   Will update outpatient CM/SW for Dr. Anna's office    .Judy Cat LCSW  x [phone]   pager # [phone]       no

## 2020-06-08 ENCOUNTER — APPOINTMENT (OUTPATIENT)
Dept: OBGYN | Facility: CLINIC | Age: 60
End: 2020-06-08
Payer: COMMERCIAL

## 2020-06-08 VITALS
WEIGHT: 215 LBS | BODY MASS INDEX: 36.7 KG/M2 | SYSTOLIC BLOOD PRESSURE: 148 MMHG | HEIGHT: 64 IN | DIASTOLIC BLOOD PRESSURE: 87 MMHG

## 2020-06-08 DIAGNOSIS — Z01.419 ENCOUNTER FOR GYNECOLOGICAL EXAMINATION (GENERAL) (ROUTINE) W/OUT ABNORMAL FINDINGS: ICD-10-CM

## 2020-06-08 DIAGNOSIS — Z09 ENCOUNTER FOR FOLLOW-UP EXAMINATION AFTER COMPLETED TREATMENT FOR CONDITIONS OTHER THAN MALIGNANT NEOPLASM: ICD-10-CM

## 2020-06-08 PROCEDURE — 58100 BIOPSY OF UTERUS LINING: CPT

## 2020-06-08 PROCEDURE — 99396 PREV VISIT EST AGE 40-64: CPT | Mod: 25

## 2020-06-08 NOTE — PROCEDURE
[Endometrial Biopsy] : Endometrial biopsy [F/U Hyperplasia] : follow-up for endometrial hyperplasia [CONSENT OBTAINED] : written consent was obtained prior to the procedure. [Ibuprofen ___ mg] : ibuprofen [unfilled] ~Umg [None] : none [Easy Passage] : allowed easy passage of a uterine sound without dilation [Sounded to ___ cm] : sounded to [unfilled] ~Ucm [Pipelle] : a Pipelle endometrial suction curette [Moderate] : a moderate [Sent to Histology] : the specimen was place in buffered formalin and sent for pathlogy [Tolerated Well] : the patient tolerated the procedure well [No Complications] : there were no complications

## 2020-06-09 NOTE — H&P PST ADULT - ENDOCRINE
Department of Anesthesiology  Preprocedure Note       Name:  Luz Maria Yu   Age:  36 y.o.  :  1980                                          MRN:  [id number]         Date:  2020      Surgeon: Karolina Naik):  Yoav Barboza MD    Procedure: Procedure(s): HERNIA VENTRAL REPAIR LAPAROSCOPIC ROBOTIC XI W/MESH    Medications prior to admission:   Prior to Admission medications    Medication Sig Start Date End Date Taking? Authorizing Provider   ibuprofen (ADVIL;MOTRIN) 800 MG tablet Take 1 tablet by mouth every 8 hours as needed for Pain 1/15/20  Yes Tim Falcon DO   omeprazole (PRILOSEC) 40 MG delayed release capsule take 1 capsule by mouth once daily 10/29/19  Yes PATRICIA Davidson NP   buPROPion (WELLBUTRIN XL) 300 MG extended release tablet Take 1 tablet by mouth every morning 18  Yes Marina Alba MD   Cholecalciferol (VITAMIN D3) 84978 units TABS One tab weekly 18  Yes Marina Alba MD   RA ALLERGY RELIEF 10 MG tablet 1 tablet daily 18  Yes Historical Provider, MD   ondansetron (ZOFRAN ODT) 4 MG disintegrating tablet Take 1 tablet by mouth every 8 hours as needed for Nausea 1/15/20   Tim Falcon DO   losartan (COZAAR) 25 MG tablet Take 25 mg by mouth daily    Historical Provider, MD       Current medications:    Current Facility-Administered Medications   Medication Dose Route Frequency Provider Last Rate Last Dose    lactated ringers infusion   Intravenous Continuous Blanca Joseph  mL/hr at 20 1137      ceFAZolin (ANCEF) 3 g in dextrose 5 % 100 mL IVPB  3 g Intravenous Once Yoav Barboza MD           Allergies:     Allergies   Allergen Reactions    Chicken Allergy      Other reaction(s): Throat issue    Naproxen Nausea Only    Peanut-Containing Drug Products      Other reaction(s): Throat issue       Problem List:    Patient Active Problem List   Diagnosis Code    Anxiety F41.9    Shoulder pain, left M25.512    Rectal bleeding K62.5    Neck pain EGD BIOPSY performed by Rodger Escobar MD at [address] [place] [address] History:    Social History     Tobacco Use    Smoking status: Never Smoker    Smokeless tobacco: Former User     Types: Chew   Substance Use Topics    Alcohol use: Yes     Alcohol/week: 2.0 standard drinks     Types: 2 Shots of liquor per week     Comment: twice a week                                Counseling given: Not Answered      Vital Signs (Current):   Vitals:    06/09/20 1124 06/09/20 1145   BP:  (!) 150/107   Pulse: 95    Resp: 16    Temp: 97.3 °F (36.3 °C)    TempSrc: Oral    SpO2: 95%    Weight: 265 lb (120.2 kg)    Height: 6' 3\" (1.905 m)                                               BP Readings from Last 3 Encounters:   06/09/20 (!) 150/107   01/24/20 (!) 151/97   01/24/20 100/60       NPO Status: Time of last liquid consumption: 2230                        Time of last solid consumption: 2230                        Date of last liquid consumption: 06/08/20                        Date of last solid food consumption: 06/08/20    BMI:   Wt Readings from Last 3 Encounters:   06/09/20 265 lb (120.2 kg)   01/24/20 254 lb 13.6 oz (115.6 kg)   01/15/20 267 lb (121.1 kg)     Body mass index is 33.12 kg/m². CBC:   Lab Results   Component Value Date    WBC 11.0 01/16/2020    RBC 4.27 01/16/2020    HGB 13.6 01/16/2020    HCT 40.3 01/16/2020    MCV 94.4 01/16/2020    RDW 13.5 01/16/2020     01/16/2020       CMP:   Lab Results   Component Value Date     01/16/2020    K 4.0 01/16/2020     01/16/2020    CO2 24 01/16/2020    BUN 7 01/16/2020    CREATININE 1.03 01/16/2020    GFRAA >60 01/16/2020    LABGLOM >60 01/16/2020    GLUCOSE 112 01/16/2020    PROT 7.2 01/15/2020    CALCIUM 8.2 01/16/2020    BILITOT 0.30 01/15/2020    ALKPHOS 103 01/15/2020    AST 26 01/15/2020    ALT 41 01/15/2020       POC Tests: No results for input(s): POCGLU, POCNA, POCK, POCCL, POCBUN, POCHEMO, POCHCT in the last 72 hours.     Coags: No results found for: PROTIME, INR, APTT    HCG (If Applicable): No results found for: PREGTESTUR, PREGSERUM, HCG, HCGQUANT     ABGs: No results found for: PHART, PO2ART, LKZ6BCO, YTW5RJD, BEART, P2BKBKWB     Type & Screen (If Applicable):  No results found for: LABABO, LABRH    Drug/Infectious Status (If Applicable):  No results found for: HIV, HEPCAB    COVID-19 Screening (If Applicable):   Lab Results   Component Value Date    COVID19 Not Detected 06/06/2020         Anesthesia Evaluation  Patient summary reviewed and Nursing notes reviewed  Airway: Mallampati: III  TM distance: >3 FB   Neck ROM: full  Mouth opening: > = 3 FB Dental: normal exam         Pulmonary: breath sounds clear to auscultation  (+) sleep apnea: on noncompliant,      (-) rhonchi, wheezes, rales and stridor                           Cardiovascular:    (+) hypertension: no interval change,     (-) murmur, weak pulses,  friction rub, systolic click, carotid bruit,  JVD and peripheral edema    ECG reviewed  Rhythm: regular  Rate: normal                    Neuro/Psych:               GI/Hepatic/Renal:             Endo/Other:                     Abdominal:           Vascular:                                        Anesthesia Plan      general     ASA 2       Induction: intravenous. MIPS: Postoperative opioids intended and Prophylactic antiemetics administered. Anesthetic plan and risks discussed with patient. Plan discussed with CRNA.                   Kiera Yu MD   6/9/2020 negative

## 2020-06-11 LAB
CYTOLOGY CVX/VAG DOC THIN PREP: ABNORMAL
HPV HIGH+LOW RISK DNA PNL CVX: NOT DETECTED

## 2020-06-18 LAB — CORE LAB BIOPSY: NORMAL

## 2020-07-10 ENCOUNTER — OUTPATIENT (OUTPATIENT)
Dept: OUTPATIENT SERVICES | Facility: HOSPITAL | Age: 60
LOS: 1 days | End: 2020-07-10
Payer: COMMERCIAL

## 2020-07-10 ENCOUNTER — APPOINTMENT (OUTPATIENT)
Dept: MRI IMAGING | Facility: CLINIC | Age: 60
End: 2020-07-10
Payer: COMMERCIAL

## 2020-07-10 ENCOUNTER — RESULT REVIEW (OUTPATIENT)
Age: 60
End: 2020-07-10

## 2020-07-10 DIAGNOSIS — Z98.890 OTHER SPECIFIED POSTPROCEDURAL STATES: Chronic | ICD-10-CM

## 2020-07-10 DIAGNOSIS — Z90.721 ACQUIRED ABSENCE OF OVARIES, UNILATERAL: Chronic | ICD-10-CM

## 2020-07-10 DIAGNOSIS — Z00.8 ENCOUNTER FOR OTHER GENERAL EXAMINATION: ICD-10-CM

## 2020-07-10 PROCEDURE — 70553 MRI BRAIN STEM W/O & W/DYE: CPT

## 2020-07-10 PROCEDURE — A9585: CPT

## 2020-07-10 PROCEDURE — 70553 MRI BRAIN STEM W/O & W/DYE: CPT | Mod: 26

## 2020-12-18 ENCOUNTER — APPOINTMENT (OUTPATIENT)
Dept: ENDOCRINOLOGY | Facility: CLINIC | Age: 60
End: 2020-12-18
Payer: COMMERCIAL

## 2020-12-18 ENCOUNTER — APPOINTMENT (OUTPATIENT)
Dept: OBGYN | Facility: CLINIC | Age: 60
End: 2020-12-18
Payer: COMMERCIAL

## 2020-12-18 ENCOUNTER — LABORATORY RESULT (OUTPATIENT)
Age: 60
End: 2020-12-18

## 2020-12-18 VITALS
DIASTOLIC BLOOD PRESSURE: 102 MMHG | HEIGHT: 64 IN | WEIGHT: 217 LBS | SYSTOLIC BLOOD PRESSURE: 160 MMHG | BODY MASS INDEX: 37.05 KG/M2 | OXYGEN SATURATION: 98 % | TEMPERATURE: 98.3 F | HEART RATE: 64 BPM

## 2020-12-18 VITALS
DIASTOLIC BLOOD PRESSURE: 88 MMHG | SYSTOLIC BLOOD PRESSURE: 141 MMHG | BODY MASS INDEX: 36.7 KG/M2 | WEIGHT: 215 LBS | HEIGHT: 64 IN

## 2020-12-18 PROCEDURE — 58100 BIOPSY OF UTERUS LINING: CPT

## 2020-12-18 PROCEDURE — 99072 ADDL SUPL MATRL&STAF TM PHE: CPT

## 2020-12-18 PROCEDURE — 99214 OFFICE O/P EST MOD 30 MIN: CPT

## 2020-12-18 NOTE — PROCEDURE
[Endometrial Biopsy] : Endometrial biopsy [F/U Hyperplasia] : follow-up for endometrial hyperplasia [Ibuprofen ___ mg] : ibuprofen [unfilled] ~Umg [Easy Passage] : Easy passage [Sounded to ___ cm] : sounded to [unfilled] ~Ucm [Scant] : scant [No Complications] : No complications [de-identified] : c/o pain

## 2020-12-18 NOTE — ASSESSMENT
[FreeTextEntry1] : 61 y/o female with long h/o hyperprolactinemia.  \par \par Pt off therapy since early 2014. Prior prolactin elevated at 128, but pt remained asymptomatic. Repeat MRI Sept 2015 stable, no clear adenoma. No evidence of other pituitary disease. Pt remains asymptomatic. No galactorrhea, HA, or change in vision.\par MRI 7/2020 12 x 10 x 5 mm pedunculated adenoma \par BMD 2018 indicates osteopenia.  She reports repeat bone density this year stable osteopenia.\par Recommended no more than calcium 500 mg per day, vitamin D. 2000 units per day, in addition to dietary intake. No additional osteoporosis rx recommended at this time. \par \par Request labs sent out. \par \par f/u in 1 year

## 2020-12-18 NOTE — HISTORY OF PRESENT ILLNESS
[FreeTextEntry1] : f/u  hyperprolactinemia. \par \par Previously tried stopping cabergoline 2009, prolactin shaq to 150. Pt was then on rx for many years. Stopped cabergoline Feb 2014. No galactorrhea, or change in visual fields, HA. MRI Sept 2015 1 cm pit, not read as adenoma, stable. \par Sees ophtho every January. 2016 had formal visual field tests. Prolactin: 128 stable. LH/FSH low.\par MRI 7/2020 12 x 10 x 5 mm pedunculated adenoma \par \par LMP June 2013 no menopausal hot flashes. Had L oophorectomy for large cyst Sept 2013.\par Up to date with mammography and GYN May 2019.\par s/p cholecystectomy ERCP for stone Feb 2020\par Pt had BMD at [facility] June 2018 reported as osteoporosis. Images reviewed. Spine: L1 and L4 -2.2 (L3 reported as -3.0) tot hip: -1.0 fem neck:L: -1.9 fem neck: R:-1.4. \par BMD 6/2020 reported as stable.\par Pt is taking Ca and Vit D. I do not believe this is true osteoporosis. \par D and C 2018 for thickening to endometrial lining, neg.\par Pt had Mirena IUD placed in 10/2019 due to endometrial hyperplasia. Last Gyn within 6 mos\par Last mammo 7/2020

## 2020-12-18 NOTE — PHYSICAL EXAM

## 2020-12-18 NOTE — HISTORY OF PRESENT ILLNESS
[TextBox_4] : Pt presents for follow up. 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  10/2019 benign path, mirena IUD.  6/20 benign path. \par Recommend EMB today and in June and can remove IUD if both biopsies are negative

## 2020-12-21 LAB
ALBUMIN SERPL ELPH-MCNC: 4.3 G/DL
ALP BLD-CCNC: 117 U/L
ALT SERPL-CCNC: 10 U/L
ANION GAP SERPL CALC-SCNC: 12 MMOL/L
AST SERPL-CCNC: 16 U/L
BASOPHILS # BLD AUTO: 0.1 K/UL
BASOPHILS NFR BLD AUTO: 1.2 %
BILIRUB SERPL-MCNC: 0.6 MG/DL
BUN SERPL-MCNC: 13 MG/DL
CALCIUM SERPL-MCNC: 10.8 MG/DL
CHLORIDE SERPL-SCNC: 106 MMOL/L
CO2 SERPL-SCNC: 20 MMOL/L
CREAT SERPL-MCNC: 0.92 MG/DL
EOSINOPHIL # BLD AUTO: 0.1 K/UL
EOSINOPHIL NFR BLD AUTO: 1.2 %
GLUCOSE SERPL-MCNC: 88 MG/DL
HCT VFR BLD CALC: 42.4 %
HGB BLD-MCNC: 13.2 G/DL
IGF-1 INTERP: NORMAL
IGF-I BLD-MCNC: 201 NG/ML
IMM GRANULOCYTES NFR BLD AUTO: 0.2 %
LYMPHOCYTES # BLD AUTO: 1.84 K/UL
LYMPHOCYTES NFR BLD AUTO: 21.6 %
MAN DIFF?: NORMAL
MCHC RBC-ENTMCNC: 26.4 PG
MCHC RBC-ENTMCNC: 31.1 GM/DL
MCV RBC AUTO: 84.8 FL
MONOCYTES # BLD AUTO: 0.63 K/UL
MONOCYTES NFR BLD AUTO: 7.4 %
NEUTROPHILS # BLD AUTO: 5.82 K/UL
NEUTROPHILS NFR BLD AUTO: 68.4 %
PLATELET # BLD AUTO: 304 K/UL
POTASSIUM SERPL-SCNC: 4.4 MMOL/L
PROLACTIN SERPL-MCNC: 62.9 NG/ML
PROT SERPL-MCNC: 7.3 G/DL
RBC # BLD: 5 M/UL
RBC # FLD: 14.4 %
SODIUM SERPL-SCNC: 138 MMOL/L
T3RU NFR SERPL: 1.1 TBI
T4 SERPL-MCNC: 6.8 UG/DL
TSH SERPL-ACNC: 1.99 UIU/ML
WBC # FLD AUTO: 8.51 K/UL

## 2021-01-03 LAB — CORE LAB BIOPSY: NORMAL

## 2021-01-04 ENCOUNTER — NON-APPOINTMENT (OUTPATIENT)
Age: 61
End: 2021-01-04

## 2021-06-11 NOTE — H&P PST ADULT - CORNEAL ABRASION RISK
Ivermectin Pregnancy And Lactation Text: This medication is Pregnancy Category C and it isn't known if it is safe during pregnancy. It is also excreted in breast milk. Clindamycin Counseling: I counseled the patient regarding use of clindamycin as an antibiotic for prophylactic and/or therapeutic purposes. Clindamycin is active against numerous classes of bacteria, including skin bacteria. Side effects may include nausea, diarrhea, gastrointestinal upset, rash, hives, yeast infections, and in rare cases, colitis. Tazorac Counseling:  Patient advised that medication is irritating and drying.  Patient may need to apply sparingly and wash off after an hour before eventually leaving it on overnight.  The patient verbalized understanding of the proper use and possible adverse effects of tazorac.  All of the patient's questions and concerns were addressed. Cyclosporine Counseling:  I discussed with the patient the risks of cyclosporine including but not limited to hypertension, gingival hyperplasia,myelosuppression, immunosuppression, liver damage, kidney damage, neurotoxicity, lymphoma, and serious infections. The patient understands that monitoring is required including baseline blood pressure, CBC, CMP, lipid panel and uric acid, and then 1-2 times monthly CMP and blood pressure. Hydroxyzine Counseling: Patient advised that the medication is sedating and not to drive a car after taking this medication.  Patient informed of potential adverse effects including but not limited to dry mouth, urinary retention, and blurry vision.  The patient verbalized understanding of the proper use and possible adverse effects of hydroxyzine.  All of the patient's questions and concerns were addressed. Quinolones Counseling:  I discussed with the patient the risks of fluoroquinolones including but not limited to GI upset, allergic reaction, drug rash, diarrhea, dizziness, photosensitivity, yeast infections, liver function test abnormalities, tendonitis/tendon rupture. Birth Control Pills Pregnancy And Lactation Text: This medication should be avoided if pregnant and for the first 30 days post-partum. Acitretin Counseling:  I discussed with the patient the risks of acitretin including but not limited to hair loss, dry lips/skin/eyes, liver damage, hyperlipidemia, depression/suicidal ideation, photosensitivity.  Serious rare side effects can include but are not limited to pancreatitis, pseudotumor cerebri, bony changes, clot formation/stroke/heart attack.  Patient understands that alcohol is contraindicated since it can result in liver toxicity and significantly prolong the elimination of the drug by many years. Elidel Counseling: Patient may experience a mild burning sensation during topical application. Elidel is not approved in children less than 2 years of age. There have been case reports of hematologic and skin malignancies in patients using topical calcineurin inhibitors although causality is questionable. Hydroquinone Pregnancy And Lactation Text: This medication has not been assigned a Pregnancy Risk Category but animal studies failed to show danger with the topical medication. It is unknown if the medication is excreted in breast milk. Azathioprine Pregnancy And Lactation Text: This medication is Pregnancy Category D and isn't considered safe during pregnancy. It is unknown if this medication is excreted in breast milk. Humira Pregnancy And Lactation Text: This medication is Pregnancy Category B and is considered safe during pregnancy. It is unknown if this medication is excreted in breast milk. Ivermectin Counseling:  Patient instructed to take medication on an empty stomach with a full glass of water.  Patient informed of potential adverse effects including but not limited to nausea, diarrhea, dizziness, itching, and swelling of the extremities or lymph nodes.  The patient verbalized understanding of the proper use and possible adverse effects of ivermectin.  All of the patient's questions and concerns were addressed. Cimetidine Counseling:  I discussed with the patient the risks of Cimetidine including but not limited to gynecomastia, headache, diarrhea, nausea, drowsiness, arrhythmias, pancreatitis, skin rashes, psychosis, bone marrow suppression and kidney toxicity. Infliximab Counseling:  I discussed with the patient the risks of infliximab including but not limited to myelosuppression, immunosuppression, autoimmune hepatitis, demyelinating diseases, lymphoma, and serious infections.  The patient understands that monitoring is required including a PPD at baseline and must alert us or the primary physician if symptoms of infection or other concerning signs are noted. Erythromycin Pregnancy And Lactation Text: This medication is Pregnancy Category B and is considered safe during pregnancy. It is also excreted in breast milk. Odomzo Pregnancy And Lactation Text: This medication is Pregnancy Category X and is absolutely contraindicated during pregnancy. It is unknown if it is excreted in breast milk. Include Pregnancy/Lactation Warning?: No Sarecycline Pregnancy And Lactation Text: This medication is Pregnancy Category D and not consider safe during pregnancy. It is also excreted in breast milk. Valtrex Counseling: I discussed with the patient the risks of valacyclovir including but not limited to kidney damage, nausea, vomiting and severe allergy.  The patient understands that if the infection seems to be worsening or is not improving, they are to call. Cimetidine Pregnancy And Lactation Text: This medication is Pregnancy Category B and is considered safe during pregnancy. It is also excreted in breast milk and breast feeding isn't recommended. Thalidomide Counseling: I discussed with the patient the risks of thalidomide including but not limited to birth defects, anxiety, weakness, chest pain, dizziness, cough and severe allergy. Enbrel Counseling:  I discussed with the patient the risks of etanercept including but not limited to myelosuppression, immunosuppression, autoimmune hepatitis, demyelinating diseases, lymphoma, and infections.  The patient understands that monitoring is required including a PPD at baseline and must alert us or the primary physician if symptoms of infection or other concerning signs are noted. Oxybutynin Pregnancy And Lactation Text: This medication is Pregnancy Category B and is considered safe during pregnancy. It is unknown if it is excreted in breast milk. Clindamycin Pregnancy And Lactation Text: This medication can be used in pregnancy if certain situations. Clindamycin is also present in breast milk. Xeljanz Counseling: I discussed with the patient the risks of Xeljanz therapy including increased risk of infection, liver issues, headache, diarrhea, or cold symptoms. Live vaccines should be avoided. They were instructed to call if they have any problems. Isotretinoin Pregnancy And Lactation Text: This medication is Pregnancy Category X and is considered extremely dangerous during pregnancy. It is unknown if it is excreted in breast milk. Erythromycin Counseling:  I discussed with the patient the risks of erythromycin including but not limited to GI upset, allergic reaction, drug rash, diarrhea, increase in liver enzymes, and yeast infections. Cephalexin Counseling: I counseled the patient regarding use of cephalexin as an antibiotic for prophylactic and/or therapeutic purposes. Cephalexin (commonly prescribed under brand name Keflex) is a cephalosporin antibiotic which is active against numerous classes of bacteria, including most skin bacteria. Side effects may include nausea, diarrhea, gastrointestinal upset, rash, hives, yeast infections, and in rare cases, hepatitis, kidney disease, seizures, fever, confusion, neurologic symptoms, and others. Patients with severe allergies to penicillin medications are cautioned that there is about a 10% incidence of cross-reactivity with cephalosporins. When possible, patients with penicillin allergies should use alternatives to cephalosporins for antibiotic therapy. Metronidazole Counseling:  I discussed with the patient the risks of metronidazole including but not limited to seizures, nausea/vomiting, a metallic taste in the mouth, nausea/vomiting and severe allergy. Acitretin Pregnancy And Lactation Text: This medication is Pregnancy Category X and should not be given to women who are pregnant or may become pregnant in the future. This medication is excreted in breast milk. Glycopyrrolate Pregnancy And Lactation Text: This medication is Pregnancy Category B and is considered safe during pregnancy. It is unknown if it is excreted breast milk. Prednisone Counseling:  I discussed with the patient the risks of prolonged use of prednisone including but not limited to weight gain, insomnia, osteoporosis, mood changes, diabetes, susceptibility to infection, glaucoma and high blood pressure.  In cases where prednisone use is prolonged, patients should be monitored with blood pressure checks, serum glucose levels and an eye exam.  Additionally, the patient may need to be placed on GI prophylaxis, PCP prophylaxis, and calcium and vitamin D supplementation and/or a bisphosphonate.  The patient verbalized understanding of the proper use and the possible adverse effects of prednisone.  All of the patient's questions and concerns were addressed. Hydroxychloroquine Counseling:  I discussed with the patient that a baseline ophthalmologic exam is needed at the start of therapy and every year thereafter while on therapy. A CBC may also be warranted for monitoring.  The side effects of this medication were discussed with the patient, including but not limited to agranulocytosis, aplastic anemia, seizures, rashes, retinopathy, and liver toxicity. Patient instructed to call the office should any adverse effect occur.  The patient verbalized understanding of the proper use and possible adverse effects of Plaquenil.  All the patient's questions and concerns were addressed. Arava Counseling:  Patient counseled regarding adverse effects of Arava including but not limited to nausea, vomiting, abnormalities in liver function tests. Patients may develop mouth sores, rash, diarrhea, and abnormalities in blood counts. The patient understands that monitoring is required including LFTs and blood counts.  There is a rare possibility of scarring of the liver and lung problems that can occur when taking methotrexate. Persistent nausea, loss of appetite, pale stools, dark urine, cough, and shortness of breath should be reported immediately. Patient advised to discontinue Arava treatment and consult with a physician prior to attempting conception. The patient will have to undergo a treatment to eliminate Arava from the body prior to conception. Ketoconazole Pregnancy And Lactation Text: This medication is Pregnancy Category C and it isn't know if it is safe during pregnancy. It is also excreted in breast milk and breast feeding isn't recommended. Dupixent Counseling: I discussed with the patient the risks of dupilumab including but not limited to eye infection and irritation, cold sores, injection site reactions, worsening of asthma, allergic reactions and increased risk of parasitic infection.  Live vaccines should be avoided while taking dupilumab. Dupilumab will also interact with certain medications such as warfarin and cyclosporine. The patient understands that monitoring is required and they must alert us or the primary physician if symptoms of infection or other concerning signs are noted. Azathioprine Counseling:  I discussed with the patient the risks of azathioprine including but not limited to myelosuppression, immunosuppression, hepatotoxicity, lymphoma, and infections.  The patient understands that monitoring is required including baseline LFTs, Creatinine, possible TPMP genotyping and weekly CBCs for the first month and then every 2 weeks thereafter.  The patient verbalized understanding of the proper use and possible adverse effects of azathioprine.  All of the patient's questions and concerns were addressed. Zyclara Counseling:  I discussed with the patient the risks of imiquimod including but not limited to erythema, scaling, itching, weeping, crusting, and pain.  Patient understands that the inflammatory response to imiquimod is variable from person to person and was educated regarded proper titration schedule.  If flu-like symptoms develop, patient knows to discontinue the medication and contact us. Zyclara Pregnancy And Lactation Text: This medication is Pregnancy Category C. It is unknown if this medication is excreted in breast milk. Tremfya Counseling: I discussed with the patient the risks of guselkumab including but not limited to immunosuppression, serious infections, worsening of inflammatory bowel disease and drug reactions.  The patient understands that monitoring is required including a PPD at baseline and must alert us or the primary physician if symptoms of infection or other concerning signs are noted. Skyrizi Counseling: I discussed with the patient the risks of risankizumab-rzaa including but not limited to immunosuppression, and serious infections.  The patient understands that monitoring is required including a PPD at baseline and must alert us or the primary physician if symptoms of infection or other concerning signs are noted. Sarecycline Counseling: Patient advised regarding possible photosensitivity and discoloration of the teeth, skin, lips, tongue and gums.  Patient instructed to avoid sunlight, if possible.  When exposed to sunlight, patients should wear protective clothing, sunglasses, and sunscreen.  The patient was instructed to call the office immediately if the following severe adverse effects occur:  hearing changes, easy bruising/bleeding, severe headache, or vision changes.  The patient verbalized understanding of the proper use and possible adverse effects of sarecycline.  All of the patient's questions and concerns were addressed. Topical Sulfur Applications Counseling: Topical Sulfur Counseling: Patient counseled that this medication may cause skin irritation or allergic reactions.  In the event of skin irritation, the patient was advised to reduce the amount of the drug applied or use it less frequently.   The patient verbalized understanding of the proper use and possible adverse effects of topical sulfur application.  All of the patient's questions and concerns were addressed. Itraconazole Pregnancy And Lactation Text: This medication is Pregnancy Category C and it isn't know if it is safe during pregnancy. It is also excreted in breast milk. Dapsone Pregnancy And Lactation Text: This medication is Pregnancy Category C and is not considered safe during pregnancy or breast feeding. Ilumya Pregnancy And Lactation Text: The risk during pregnancy and breastfeeding is uncertain with this medication. Metronidazole Pregnancy And Lactation Text: This medication is Pregnancy Category B and considered safe during pregnancy.  It is also excreted in breast milk. Dapsone Counseling: I discussed with the patient the risks of dapsone including but not limited to hemolytic anemia, agranulocytosis, rashes, methemoglobinemia, kidney failure, peripheral neuropathy, headaches, GI upset, and liver toxicity.  Patients who start dapsone require monitoring including baseline LFTs and weekly CBCs for the first month, then every month thereafter.  The patient verbalized understanding of the proper use and possible adverse effects of dapsone.  All of the patient's questions and concerns were addressed. Doxycycline Pregnancy And Lactation Text: This medication is Pregnancy Category D and not consider safe during pregnancy. It is also excreted in breast milk but is considered safe for shorter treatment courses. Doxycycline Counseling:  Patient counseled regarding possible photosensitivity and increased risk for sunburn.  Patient instructed to avoid sunlight, if possible.  When exposed to sunlight, patients should wear protective clothing, sunglasses, and sunscreen.  The patient was instructed to call the office immediately if the following severe adverse effects occur:  hearing changes, easy bruising/bleeding, severe headache, or vision changes.  The patient verbalized understanding of the proper use and possible adverse effects of doxycycline.  All of the patient's questions and concerns were addressed. daily eye drops/prn Drysol Counseling:  I discussed with the patient the risks of drysol/aluminum chloride including but not limited to skin rash, itching, irritation, burning. Valtrex Pregnancy And Lactation Text: this medication is Pregnancy Category B and is considered safe during pregnancy. This medication is not directly found in breast milk but it's metabolite acyclovir is present. Methotrexate Counseling:  Patient counseled regarding adverse effects of methotrexate including but not limited to nausea, vomiting, abnormalities in liver function tests. Patients may develop mouth sores, rash, diarrhea, and abnormalities in blood counts. The patient understands that monitoring is required including LFT's and blood counts.  There is a rare possibility of scarring of the liver and lung problems that can occur when taking methotrexate. Persistent nausea, loss of appetite, pale stools, dark urine, cough, and shortness of breath should be reported immediately. Patient advised to discontinue methotrexate treatment at least three months before attempting to become pregnant.  I discussed the need for folate supplements while taking methotrexate.  These supplements can decrease side effects during methotrexate treatment. The patient verbalized understanding of the proper use and possible adverse effects of methotrexate.  All of the patient's questions and concerns were addressed. Glycopyrrolate Counseling:  I discussed with the patient the risks of glycopyrrolate including but not limited to skin rash, drowsiness, dry mouth, difficulty urinating, and blurred vision. Ilumya Counseling: I discussed with the patient the risks of tildrakizumab including but not limited to immunosuppression, malignancy, posterior leukoencephalopathy syndrome, and serious infections.  The patient understands that monitoring is required including a PPD at baseline and must alert us or the primary physician if symptoms of infection or other concerning signs are noted. Gabapentin Counseling: I discussed with the patient the risks of gabapentin including but not limited to dizziness, somnolence, fatigue and ataxia. Spironolactone Pregnancy And Lactation Text: This medication can cause feminization of the male fetus and should be avoided during pregnancy. The active metabolite is also found in breast milk. Clofazimine Pregnancy And Lactation Text: This medication is Pregnancy Category C and isn't considered safe during pregnancy. It is excreted in breast milk. Simponi Counseling:  I discussed with the patient the risks of golimumab including but not limited to myelosuppression, immunosuppression, autoimmune hepatitis, demyelinating diseases, lymphoma, and serious infections.  The patient understands that monitoring is required including a PPD at baseline and must alert us or the primary physician if symptoms of infection or other concerning signs are noted. Carac Pregnancy And Lactation Text: This medication is Pregnancy Category X and contraindicated in pregnancy and in women who may become pregnant. It is unknown if this medication is excreted in breast milk. Azithromycin Counseling:  I discussed with the patient the risks of azithromycin including but not limited to GI upset, allergic reaction, drug rash, diarrhea, and yeast infections. Griseofulvin Counseling:  I discussed with the patient the risks of griseofulvin including but not limited to photosensitivity, cytopenia, liver damage, nausea/vomiting and severe allergy.  The patient understands that this medication is best absorbed when taken with a fatty meal (e.g., ice cream or french fries). Spironolactone Counseling: Patient advised regarding risks of diarrhea, abdominal pain, hyperkalemia, birth defects (for female patients), liver toxicity and renal toxicity. The patient may need blood work to monitor liver and kidney function and potassium levels while on therapy. The patient verbalized understanding of the proper use and possible adverse effects of spironolactone.  All of the patient's questions and concerns were addressed. Rituxan Counseling:  I discussed with the patient the risks of Rituxan infusions. Side effects can include infusion reactions, severe drug rashes including mucocutaneous reactions, reactivation of latent hepatitis and other infections and rarely progressive multifocal leukoencephalopathy.  All of the patient's questions and concerns were addressed. Cephalexin Pregnancy And Lactation Text: This medication is Pregnancy Category B and considered safe during pregnancy.  It is also excreted in breast milk but can be used safely for shorter doses. Nsaids Counseling: NSAID Counseling: I discussed with the patient that NSAIDs should be taken with food. Prolonged use of NSAIDs can result in the development of stomach ulcers.  Patient advised to stop taking NSAIDs if abdominal pain occurs.  The patient verbalized understanding of the proper use and possible adverse effects of NSAIDs.  All of the patient's questions and concerns were addressed. Tetracycline Counseling: Patient counseled regarding possible photosensitivity and increased risk for sunburn.  Patient instructed to avoid sunlight, if possible.  When exposed to sunlight, patients should wear protective clothing, sunglasses, and sunscreen.  The patient was instructed to call the office immediately if the following severe adverse effects occur:  hearing changes, easy bruising/bleeding, severe headache, or vision changes.  The patient verbalized understanding of the proper use and possible adverse effects of tetracycline.  All of the patient's questions and concerns were addressed. Patient understands to avoid pregnancy while on therapy due to potential birth defects. Cyclophosphamide Pregnancy And Lactation Text: This medication is Pregnancy Category D and it isn't considered safe during pregnancy. This medication is excreted in breast milk. Methotrexate Pregnancy And Lactation Text: This medication is Pregnancy Category X and is known to cause fetal harm. This medication is excreted in breast milk. Topical Retinoid counseling:  Patient advised to apply a pea-sized amount only at bedtime and wait 30 minutes after washing their face before applying.  If too drying, patient may add a non-comedogenic moisturizer. The patient verbalized understanding of the proper use and possible adverse effects of retinoids.  All of the patient's questions and concerns were addressed. Albendazole Counseling:  I discussed with the patient the risks of albendazole including but not limited to cytopenia, kidney damage, nausea/vomiting and severe allergy.  The patient understands that this medication is being used in an off-label manner. High Dose Vitamin A Counseling: Side effects reviewed, pt to contact office should one occur. Protopic Pregnancy And Lactation Text: This medication is Pregnancy Category C. It is unknown if this medication is excreted in breast milk when applied topically. Solaraze Counseling:  I discussed with the patient the risks of Solaraze including but not limited to erythema, scaling, itching, weeping, crusting, and pain. Imiquimod Counseling:  I discussed with the patient the risks of imiquimod including but not limited to erythema, scaling, itching, weeping, crusting, and pain.  Patient understands that the inflammatory response to imiquimod is variable from person to person and was educated regarded proper titration schedule.  If flu-like symptoms develop, patient knows to discontinue the medication and contact us. Bactrim Pregnancy And Lactation Text: This medication is Pregnancy Category D and is known to cause fetal risk.  It is also excreted in breast milk. Ketoconazole Counseling:   Patient counseled regarding improving absorption with orange juice.  Adverse effects include but are not limited to breast enlargement, headache, diarrhea, nausea, upset stomach, liver function test abnormalities, taste disturbance, and stomach pain.  There is a rare possibility of liver failure that can occur when taking ketoconazole. The patient understands that monitoring of LFTs may be required, especially at baseline. The patient verbalized understanding of the proper use and possible adverse effects of ketoconazole.  All of the patient's questions and concerns were addressed. High Dose Vitamin A Pregnancy And Lactation Text: High dose vitamin A therapy is contraindicated during pregnancy and breast feeding. Taltz Counseling: I discussed with the patient the risks of ixekizumab including but not limited to immunosuppression, serious infections, worsening of inflammatory bowel disease and drug reactions.  The patient understands that monitoring is required including a PPD at baseline and must alert us or the primary physician if symptoms of infection or other concerning signs are noted. Hydroxyzine Pregnancy And Lactation Text: This medication is not safe during pregnancy and should not be taken. It is also excreted in breast milk and breast feeding isn't recommended. Itraconazole Counseling:  I discussed with the patient the risks of itraconazole including but not limited to liver damage, nausea/vomiting, neuropathy, and severe allergy.  The patient understands that this medication is best absorbed when taken with acidic beverages such as non-diet cola or ginger ale.  The patient understands that monitoring is required including baseline LFTs and repeat LFTs at intervals.  The patient understands that they are to contact us or the primary physician if concerning signs are noted. Cyclosporine Pregnancy And Lactation Text: This medication is Pregnancy Category C and it isn't know if it is safe during pregnancy. This medication is excreted in breast milk. Tazorac Pregnancy And Lactation Text: This medication is not safe during pregnancy. It is unknown if this medication is excreted in breast milk. Oxybutynin Counseling:  I discussed with the patient the risks of oxybutynin including but not limited to skin rash, drowsiness, dry mouth, difficulty urinating, and blurred vision. Otezla Pregnancy And Lactation Text: This medication is Pregnancy Category C and it isn't known if it is safe during pregnancy. It is unknown if it is excreted in breast milk. Hydroquinone Counseling:  Patient advised that medication may result in skin irritation, lightening (hypopigmentation), dryness, and burning.  In the event of skin irritation, the patient was advised to reduce the amount of the drug applied or use it less frequently.  Rarely, spots that are treated with hydroquinone can become darker (pseudoochronosis).  Should this occur, patient instructed to stop medication and call the office. The patient verbalized understanding of the proper use and possible adverse effects of hydroquinone.  All of the patient's questions and concerns were addressed. Sski Pregnancy And Lactation Text: This medication is Pregnancy Category D and isn't considered safe during pregnancy. It is excreted in breast milk. Eucrisa Counseling: Patient may experience a mild burning sensation during topical application. Eucrisa is not approved in children less than 2 years of age. Birth Control Pills Counseling: Birth Control Pill Counseling: I discussed with the patient the potential side effects of OCPs including but not limited to increased risk of stroke, heart attack, thrombophlebitis, deep venous thrombosis, hepatic adenomas, breast changes, GI upset, headaches, and depression.  The patient verbalized understanding of the proper use and possible adverse effects of OCPs. All of the patient's questions and concerns were addressed. Dupixent Pregnancy And Lactation Text: This medication likely crosses the placenta but the risk for the fetus is uncertain. This medication is excreted in breast milk. Minocycline Counseling: Patient advised regarding possible photosensitivity and discoloration of the teeth, skin, lips, tongue and gums.  Patient instructed to avoid sunlight, if possible.  When exposed to sunlight, patients should wear protective clothing, sunglasses, and sunscreen.  The patient was instructed to call the office immediately if the following severe adverse effects occur:  hearing changes, easy bruising/bleeding, severe headache, or vision changes.  The patient verbalized understanding of the proper use and possible adverse effects of minocycline.  All of the patient's questions and concerns were addressed. Isotretinoin Counseling: Patient should get monthly blood tests, not donate blood, not drive at night if vision affected, not share medication, and not undergo elective surgery for 6 months after tx completed. Side effects reviewed, pt to contact office should one occur. Otezla Counseling: The side effects of Otezla were discussed with the patient, including but not limited to worsening or new depression, weight loss, diarrhea, nausea, upper respiratory tract infection, and headache. Patient instructed to call the office should any adverse effect occur.  The patient verbalized understanding of the proper use and possible adverse effects of Otezla.  All the patient's questions and concerns were addressed. Stelara Counseling:  I discussed with the patient the risks of ustekinumab including but not limited to immunosuppression, malignancy, posterior leukoencephalopathy syndrome, and serious infections.  The patient understands that monitoring is required including a PPD at baseline and must alert us or the primary physician if symptoms of infection or other concerning signs are noted. Solaraze Pregnancy And Lactation Text: This medication is Pregnancy Category B and is considered safe. There is some data to suggest avoiding during the third trimester. It is unknown if this medication is excreted in breast milk. Hydroxychloroquine Pregnancy And Lactation Text: This medication has been shown to cause fetal harm but it isn't assigned a Pregnancy Risk Category. There are small amounts excreted in breast milk. Clofazimine Counseling:  I discussed with the patient the risks of clofazimine including but not limited to skin and eye pigmentation, liver damage, nausea/vomiting, gastrointestinal bleeding and allergy. Bactrim Counseling:  I discussed with the patient the risks of sulfa antibiotics including but not limited to GI upset, allergic reaction, drug rash, diarrhea, dizziness, photosensitivity, and yeast infections.  Rarely, more serious reactions can occur including but not limited to aplastic anemia, agranulocytosis, methemoglobinemia, blood dyscrasias, liver or kidney failure, lung infiltrates or desquamative/blistering drug rashes. Terbinafine Counseling: Patient counseling regarding adverse effects of terbinafine including but not limited to headache, diarrhea, rash, upset stomach, liver function test abnormalities, itching, taste/smell disturbance, nausea, abdominal pain, and flatulence.  There is a rare possibility of liver failure that can occur when taking terbinafine.  The patient understands that a baseline LFT and kidney function test may be required. The patient verbalized understanding of the proper use and possible adverse effects of terbinafine.  All of the patient's questions and concerns were addressed. Topical Clindamycin Counseling: Patient counseled that this medication may cause skin irritation or allergic reactions.  In the event of skin irritation, the patient was advised to reduce the amount of the drug applied or use it less frequently.   The patient verbalized understanding of the proper use and possible adverse effects of clindamycin.  All of the patient's questions and concerns were addressed. Xolair Counseling:  Patient informed of potential adverse effects including but not limited to fever, muscle aches, rash and allergic reactions.  The patient verbalized understanding of the proper use and possible adverse effects of Xolair.  All of the patient's questions and concerns were addressed. Xeldennisez Pregnancy And Lactation Text: This medication is Pregnancy Category D and is not considered safe during pregnancy.  The risk during breast feeding is also uncertain. Fluconazole Counseling:  Patient counseled regarding adverse effects of fluconazole including but not limited to headache, diarrhea, nausea, upset stomach, liver function test abnormalities, taste disturbance, and stomach pain.  There is a rare possibility of liver failure that can occur when taking fluconazole.  The patient understands that monitoring of LFTs and kidney function test may be required, especially at baseline. The patient verbalized understanding of the proper use and possible adverse effects of fluconazole.  All of the patient's questions and concerns were addressed. SSKI Counseling:  I discussed with the patient the risks of SSKI including but not limited to thyroid abnormalities, metallic taste, GI upset, fever, headache, acne, arthralgias, paraesthesias, lymphadenopathy, easy bleeding, arrhythmias, and allergic reaction. Rituxan Pregnancy And Lactation Text: This medication is Pregnancy Category C and it isn't know if it is safe during pregnancy. It is unknown if this medication is excreted in breast milk but similar antibodies are known to be excreted. Cimzia Pregnancy And Lactation Text: This medication crosses the placenta but can be considered safe in certain situations. Cimzia may be excreted in breast milk. Doxepin Counseling:  Patient advised that the medication is sedating and not to drive a car after taking this medication. Patient informed of potential adverse effects including but not limited to dry mouth, urinary retention, and blurry vision.  The patient verbalized understanding of the proper use and possible adverse effects of doxepin.  All of the patient's questions and concerns were addressed. Azithromycin Pregnancy And Lactation Text: This medication is considered safe during pregnancy and is also secreted in breast milk. Carac Counseling:  I discussed with the patient the risks of Carac including but not limited to erythema, scaling, itching, weeping, crusting, and pain. Bexarotene Pregnancy And Lactation Text: This medication is Pregnancy Category X and should not be given to women who are pregnant or may become pregnant. This medication should not be used if you are breast feeding. Protopic Counseling: Patient may experience a mild burning sensation during topical application. Protopic is not approved in children less than 2 years of age. There have been case reports of hematologic and skin malignancies in patients using topical calcineurin inhibitors although causality is questionable. Topical Sulfur Applications Pregnancy And Lactation Text: This medication is Pregnancy Category C and has an unknown safety profile during pregnancy. It is unknown if this topical medication is excreted in breast milk. Cosentyx Counseling:  I discussed with the patient the risks of Cosentyx including but not limited to worsening of Crohn's disease, immunosuppression, allergic reactions and infections.  The patient understands that monitoring is required including a PPD at baseline and must alert us or the primary physician if symptoms of infection or other concerning signs are noted. Humira Counseling:  I discussed with the patient the risks of adalimumab including but not limited to myelosuppression, immunosuppression, autoimmune hepatitis, demyelinating diseases, lymphoma, and serious infections.  The patient understands that monitoring is required including a PPD at baseline and must alert us or the primary physician if symptoms of infection or other concerning signs are noted. Bexarotene Counseling:  I discussed with the patient the risks of bexarotene including but not limited to hair loss, dry lips/skin/eyes, liver abnormalities, hyperlipidemia, pancreatitis, depression/suicidal ideation, photosensitivity, drug rash/allergic reactions, hypothyroidism, anemia, leukopenia, infection, cataracts, and teratogenicity.  Patient understands that they will need regular blood tests to check lipid profile, liver function tests, white blood cell count, thyroid function tests and pregnancy test if applicable. Nsaids Pregnancy And Lactation Text: These medications are considered safe up to 30 weeks gestation. It is excreted in breast milk. Doxepin Pregnancy And Lactation Text: This medication is Pregnancy Category C and it isn't known if it is safe during pregnancy. It is also excreted in breast milk and breast feeding isn't recommended. Erivedge Counseling- I discussed with the patient the risks of Erivedge including but not limited to nausea, vomiting, diarrhea, constipation, weight loss, changes in the sense of taste, decreased appetite, muscle spasms, and hair loss.  The patient verbalized understanding of the proper use and possible adverse effects of Erivedge.  All of the patient's questions and concerns were addressed. Benzoyl Peroxide Counseling: Patient counseled that medicine may cause skin irritation and bleach clothing.  In the event of skin irritation, the patient was advised to reduce the amount of the drug applied or use it less frequently.   The patient verbalized understanding of the proper use and possible adverse effects of benzoyl peroxide.  All of the patient's questions and concerns were addressed. 5-Fu Counseling: 5-Fluorouracil Counseling:  I discussed with the patient the risks of 5-fluorouracil including but not limited to erythema, scaling, itching, weeping, crusting, and pain. Picato Counseling:  I discussed with the patient the risks of Picato including but not limited to erythema, scaling, itching, weeping, crusting, and pain. Cyclophosphamide Counseling:  I discussed with the patient the risks of cyclophosphamide including but not limited to hair loss, hormonal abnormalities, decreased fertility, abdominal pain, diarrhea, nausea and vomiting, bone marrow suppression and infection. The patient understands that monitoring is required while taking this medication. Colchicine Counseling:  Patient counseled regarding adverse effects including but not limited to stomach upset (nausea, vomiting, stomach pain, or diarrhea).  Patient instructed to limit alcohol consumption while taking this medication.  Colchicine may reduce blood counts especially with prolonged use.  The patient understands that monitoring of kidney function and blood counts may be required, especially at baseline. The patient verbalized understanding of the proper use and possible adverse effects of colchicine.  All of the patient's questions and concerns were addressed. Siliq Counseling:  I discussed with the patient the risks of Siliq including but not limited to new or worsening depression, suicidal thoughts and behavior, immunosuppression, malignancy, posterior leukoencephalopathy syndrome, and serious infections.  The patient understands that monitoring is required including a PPD at baseline and must alert us or the primary physician if symptoms of infection or other concerning signs are noted. There is also a special program designed to monitor depression which is required with Siliq. Griseofulvin Pregnancy And Lactation Text: This medication is Pregnancy Category X and is known to cause serious birth defects. It is unknown if this medication is excreted in breast milk but breast feeding should be avoided. Benzoyl Peroxide Pregnancy And Lactation Text: This medication is Pregnancy Category C. It is unknown if benzoyl peroxide is excreted in breast milk. Drysol Pregnancy And Lactation Text: This medication is considered safe during pregnancy and breast feeding. Cellcept Counseling:  I discussed with the patient the risks of mycophenolate mofetil including but not limited to infection/immunosuppression, GI upset, hypokalemia, hypercholesterolemia, bone marrow suppression, lymphoproliferative disorders, malignancy, GI ulceration/bleed/perforation, colitis, interstitial lung disease, kidney failure, progressive multifocal leukoencephalopathy, and birth defects.  The patient understands that monitoring is required including a baseline creatinine and regular CBC testing. In addition, patient must alert us immediately if symptoms of infection or other concerning signs are noted. Rifampin Pregnancy And Lactation Text: This medication is Pregnancy Category C and it isn't know if it is safe during pregnancy. It is also excreted in breast milk and should not be used if you are breast feeding. Minoxidil Counseling: Minoxidil is a topical medication which can increase blood flow where it is applied. It is uncertain how this medication increases hair growth. Side effects are uncommon and include stinging and allergic reactions. Odomzo Counseling- I discussed with the patient the risks of Odomzo including but not limited to nausea, vomiting, diarrhea, constipation, weight loss, changes in the sense of taste, decreased appetite, muscle spasms, and hair loss.  The patient verbalized understanding of the proper use and possible adverse effects of Odomzo.  All of the patient's questions and concerns were addressed. Cimzia Counseling:  I discussed with the patient the risks of Cimzia including but not limited to immunosuppression, allergic reactions and infections.  The patient understands that monitoring is required including a PPD at baseline and must alert us or the primary physician if symptoms of infection or other concerning signs are noted. Detail Level: Detailed Rifampin Counseling: I discussed with the patient the risks of rifampin including but not limited to liver damage, kidney damage, red-orange body fluids, nausea/vomiting and severe allergy. Xolair Pregnancy And Lactation Text: This medication is Pregnancy Category B and is considered safe during pregnancy. This medication is excreted in breast milk.

## 2021-07-07 ENCOUNTER — NON-APPOINTMENT (OUTPATIENT)
Age: 61
End: 2021-07-07

## 2021-09-24 ENCOUNTER — APPOINTMENT (OUTPATIENT)
Dept: OBGYN | Facility: CLINIC | Age: 61
End: 2021-09-24
Payer: COMMERCIAL

## 2021-09-24 VITALS
DIASTOLIC BLOOD PRESSURE: 84 MMHG | WEIGHT: 220 LBS | HEIGHT: 64 IN | SYSTOLIC BLOOD PRESSURE: 146 MMHG | BODY MASS INDEX: 37.56 KG/M2

## 2021-09-24 PROCEDURE — 99396 PREV VISIT EST AGE 40-64: CPT

## 2021-09-24 RX ORDER — AMOXICILLIN 875 MG/1
875 TABLET, FILM COATED ORAL
Qty: 14 | Refills: 0 | Status: COMPLETED | COMMUNITY
Start: 2021-05-28

## 2021-09-24 RX ORDER — IBUPROFEN 800 MG/1
800 TABLET, FILM COATED ORAL
Qty: 30 | Refills: 0 | Status: COMPLETED | COMMUNITY
Start: 2021-05-28

## 2021-09-24 NOTE — HISTORY OF PRESENT ILLNESS
[TextBox_4] : No complaints  [PapSmeardate] : 2020 [Mammogramdate] : 2020 [BoneDensityDate] : 2020 [ColonoscopyDate] : 2020

## 2021-09-24 NOTE — PHYSICAL EXAM
[Appropriately responsive] : appropriately responsive [Alert] : alert [No Acute Distress] : no acute distress [No Murmurs] : no murmurs [Soft] : soft [Non-tender] : non-tender [Non-distended] : non-distended [No Lesions] : no lesions [No Mass] : no mass [Oriented x3] : oriented x3 [Examination Of The Breasts] : a normal appearance [No Masses] : no breast masses were palpable [Labia Minora] : normal [Labia Majora] : normal [IUD String] : an IUD string was noted [Normal] : normal [Uterine Adnexae] : normal

## 2021-09-28 LAB — HPV HIGH+LOW RISK DNA PNL CVX: NOT DETECTED

## 2021-09-29 NOTE — H&P PST ADULT - REASON FOR ADMISSION
Suturing in progress, pt in NAD. Pt  at bedside   "I am going to have  a D&C...thickening of the lining" "I am going to have  a D&C thickening of the lining"

## 2021-09-30 LAB — CYTOLOGY CVX/VAG DOC THIN PREP: NORMAL

## 2021-10-22 ENCOUNTER — APPOINTMENT (OUTPATIENT)
Dept: OBGYN | Facility: CLINIC | Age: 61
End: 2021-10-22
Payer: COMMERCIAL

## 2021-10-22 DIAGNOSIS — N85.01 BENIGN ENDOMETRIAL HYPERPLASIA: ICD-10-CM

## 2021-10-22 PROCEDURE — 58100 BIOPSY OF UTERUS LINING: CPT

## 2021-10-22 NOTE — PROCEDURE
[Endometrial Biopsy] : Endometrial biopsy [F/U Hyperplasia] : follow-up for endometrial hyperplasia [Risks] : risks [Patient] : patient [Ibuprofen ___ mg] : ibuprofen [unfilled] ~Umg [None] : none [Easy Passage] : Easy passage [Sounded to ___ cm] : sounded to [unfilled] ~Ucm [Scant] : scant [Sent to Pathology] : placed in buffered formalin and sent for pathology [Tolerated Well] : Patient tolerated the procedure well [No Complications] : No complications

## 2021-11-05 LAB — CORE LAB BIOPSY: NORMAL

## 2021-11-08 ENCOUNTER — NON-APPOINTMENT (OUTPATIENT)
Age: 61
End: 2021-11-08

## 2021-12-14 ENCOUNTER — NON-APPOINTMENT (OUTPATIENT)
Age: 61
End: 2021-12-14

## 2021-12-23 ENCOUNTER — APPOINTMENT (OUTPATIENT)
Dept: ENDOCRINOLOGY | Facility: CLINIC | Age: 61
End: 2021-12-23
Payer: COMMERCIAL

## 2021-12-23 VITALS
TEMPERATURE: 98.6 F | HEIGHT: 64 IN | WEIGHT: 229 LBS | OXYGEN SATURATION: 95 % | SYSTOLIC BLOOD PRESSURE: 143 MMHG | HEART RATE: 93 BPM | BODY MASS INDEX: 39.09 KG/M2 | DIASTOLIC BLOOD PRESSURE: 94 MMHG

## 2021-12-23 PROCEDURE — 99214 OFFICE O/P EST MOD 30 MIN: CPT

## 2021-12-30 NOTE — PHYSICAL EXAM
[Alert] : alert [Well Nourished] : well nourished [No Acute Distress] : no acute distress [Well Developed] : well developed [Normal Sclera/Conjunctiva] : normal sclera/conjunctiva [EOMI] : extra ocular movement intact [No Proptosis] : no proptosis [Thyroid Not Enlarged] : the thyroid was not enlarged [No Thyroid Nodules] : no palpable thyroid nodules [Clear to Auscultation] : lungs were clear to auscultation bilaterally [Normal S1, S2] : normal S1 and S2 [Normal Rate] : heart rate was normal [Regular Rhythm] : with a regular rhythm [No Edema] : no peripheral edema [Pedal Pulses Normal] : the pedal pulses are present [Normal Bowel Sounds] : normal bowel sounds [Not Tender] : non-tender [Not Distended] : not distended [Soft] : abdomen soft [Normal Anterior Cervical Nodes] : no anterior cervical lymphadenopathy [No Spinal Tenderness] : no spinal tenderness [Spine Straight] : spine straight [No Stigmata of Cushings Syndrome] : no stigmata of Cushings Syndrome [Normal Gait] : normal gait [No Rash] : no rash [Normal Reflexes] : deep tendon reflexes were 2+ and symmetric [No Tremors] : no tremors [Oriented x3] : oriented to person, place, and time [Normal Affect] : the affect was normal [Normal Mood] : the mood was normal

## 2021-12-30 NOTE — HISTORY OF PRESENT ILLNESS
[FreeTextEntry1] : 61 yr old F here for f/u hyperprolactinemia. \par \par Previously tried stopping cabergoline 2009, prolactin shaq to 150. Pt was then on rx for many years. Stopped cabergoline Feb 2014. No galactorrhea, or change in visual fields, HA. MRI Sept 2015 1 cm pit, not read as adenoma, stable. \par Sees ophtho every January. 2016 had formal visual field tests. Prolactin: 128 stable. LH/FSH low.\par MRI 7/2020 12 x 10 x 5 mm pedunculated adenoma \par \par LMP June 2013 no menopausal hot flashes. Had L oophorectomy for large cyst Sept 2013.\par Up to date with mammography and GYN May 2019.\par s/p cholecystectomy ERCP for stone Feb 2020\par Pt had BMD at [facility] June 2018 reported as osteoporosis. Images reviewed. Spine: L1 and L4 -2.2 (L3 reported as -3.0) tot hip: -1.0 fem neck:L: -1.9 fem neck: R:-1.4. (was not believed to be true osteoporosis)\par BMD 6/2020 reported as stable.\par Hx of osteoporosis in Mother\par Pt is taking Ca and Vit D. \par D and C 2018 for thickening to endometrial lining, neg.\par Pt had Mirena IUD placed in 10/2019 due to endometrial hyperplasia. Last Gyn within 6 mos

## 2021-12-30 NOTE — ASSESSMENT
[FreeTextEntry1] : 60 y/o female with long h/o hyperprolactinemia.  \par \par Pt off therapy since early 2014. PRL levels have remained slightly elevated but pt has remained asymptomatic. Repeat MRI Sept 2015 stable, no clear adenoma. No evidence of other pituitary disease. Pt remains asymptomatic. No galactorrhea, HA, or change in vision.\par MRI 7/2020 12 x 10 x 5 mm pedunculated adenoma \par BMD 2018 indicated osteopenia. June 2020 BMD stable osteopenia.\par Recommended no more than calcium 500 mg per day, vitamin D. 2000 units per day, in addition to dietary intake. No additional osteoporosis rx recommended at this time. \par Will repeat pituitary wkup including IGF-1, TSH/FT4, PRL and check 24 hour Urine Cortisol\par \par f/u in 1 year

## 2022-01-01 NOTE — ASU PATIENT PROFILE, ADULT - CAREGIVER ADDRESS
Date of Birth: [birth year]	Time of Birth:     Admission Weight (g): 3539    Admission Date and Time:  22 @ 19:32         Gestational Age: 40     Source of admission [ __ ] Inborn     [ __ ]Transport from    [facility]: 40.0wk female born via  to a 35y/o  blood type A+ mother. Maternal history of hypothyroidism on synthroid and Celiac disease. No significant prenatal history. PNL -/-/NR/I, GBS - on . SROM at 01:15 on  with clear fluids. Peds called for cat II tracing. Baby emerged vigorous, crying, was w/d/s/s with APGARS of 9/9. Mom plans to initiate breastfeeding, consents Hep B vaccine.  EOS 1.34.  Highest maternal temp 38.3. Baby admitted to NICU for EOS > 1. BW: 3539g      Social History: No history of alcohol/tobacco exposure obtained  FHx: non-contributory to the condition being treated or details of FH documented here  ROS: unable to obtain ()     
[facility] [place]itte [place]

## 2022-01-12 LAB
25(OH)D3 SERPL-MCNC: 37.7 NG/ML
ALBUMIN SERPL ELPH-MCNC: 4.4 G/DL
ALP BLD-CCNC: 131 U/L
ALT SERPL-CCNC: 13 U/L
ANION GAP SERPL CALC-SCNC: 13 MMOL/L
AST SERPL-CCNC: 15 U/L
BILIRUB SERPL-MCNC: 0.3 MG/DL
BUN SERPL-MCNC: 16 MG/DL
CALCIUM SERPL-MCNC: 11.1 MG/DL
CHLORIDE SERPL-SCNC: 106 MMOL/L
CO2 SERPL-SCNC: 23 MMOL/L
CREAT SERPL-MCNC: 0.93 MG/DL
GLUCOSE SERPL-MCNC: 90 MG/DL
IGF-1 INTERP: NORMAL
IGF-I BLD-MCNC: 198 NG/ML
MONOMERIC PROLACTIN (ICMA)*: 55.8 NG/ML
PERCENT MACROPROLACTIN: 9 %
POTASSIUM SERPL-SCNC: 4.9 MMOL/L
PROLACTIN SERPL-MCNC: 68.7 NG/ML
PROLACTIN, SERUM (ICMA)*: 61.6 NG/ML
PROT SERPL-MCNC: 7.4 G/DL
SODIUM SERPL-SCNC: 141 MMOL/L
T4 FREE SERPL-MCNC: 1 NG/DL
TSH SERPL-ACNC: 2.2 UIU/ML

## 2022-03-24 ENCOUNTER — NON-APPOINTMENT (OUTPATIENT)
Age: 62
End: 2022-03-24

## 2022-03-24 LAB
ALBUMIN SERPL ELPH-MCNC: 4.3 G/DL
ALP BLD-CCNC: 125 U/L
ALT SERPL-CCNC: 16 U/L
ANION GAP SERPL CALC-SCNC: 11 MMOL/L
AST SERPL-CCNC: 15 U/L
BILIRUB SERPL-MCNC: 0.6 MG/DL
BUN SERPL-MCNC: 11 MG/DL
CALCIUM SERPL-MCNC: 10.5 MG/DL
CALCIUM SERPL-MCNC: 10.5 MG/DL
CHLORIDE SERPL-SCNC: 106 MMOL/L
CO2 SERPL-SCNC: 24 MMOL/L
CREAT SERPL-MCNC: 0.93 MG/DL
GLUCOSE SERPL-MCNC: 105 MG/DL
PARATHYROID HORMONE INTACT: 118 PG/ML
POTASSIUM SERPL-SCNC: 5 MMOL/L
PROT SERPL-MCNC: 7.1 G/DL
SODIUM SERPL-SCNC: 142 MMOL/L

## 2022-07-13 NOTE — ASU PREOP CHECKLIST - AS TEMP SITE
oral Rhofade Counseling: Rhofade is a topical medication which can decrease superficial blood flow where applied. Side effects are uncommon and include stinging, redness and allergic reactions.

## 2022-09-17 NOTE — ED PROVIDER NOTE - ABDOMINAL TENDER
HPI   55-year-old female patient presented to emergency department for evaluation of headache dry heaving. Patient reporting symptoms had started around noon today. She says she is having some associated congestion as well. She notes that normally her headaches are not this intense and usually go away. She took some aspirin without any significant improvement. She is denying any blurry vision, numbness tingling or weakness, no photophobia or phonophobia. Denies any dizziness, loss of consciousness, chest pain, shortness of breath. Patient reports that the headache was gradual and not sudden onset however it is currently severe. Review of Systems   Constitutional:  Negative for chills and fever. HENT:  Negative for congestion. Respiratory:  Negative for cough and shortness of breath. Cardiovascular:  Negative for chest pain. Gastrointestinal:  Positive for nausea and vomiting. Negative for abdominal pain and diarrhea. Genitourinary:  Negative for difficulty urinating, dysuria and hematuria. Musculoskeletal:  Negative for back pain. Skin:  Negative for color change. Neurological:  Positive for headaches. All other systems reviewed and are negative. Physical Exam  Vitals and nursing note reviewed. Constitutional:       Appearance: Normal appearance. HENT:      Head: Normocephalic and atraumatic. Nose: Nose normal. No congestion. Mouth/Throat:      Mouth: Mucous membranes are moist.      Pharynx: Oropharynx is clear. Eyes:      Conjunctiva/sclera: Conjunctivae normal.      Pupils: Pupils are equal, round, and reactive to light. Cardiovascular:      Rate and Rhythm: Normal rate and regular rhythm. Pulses: Normal pulses. Heart sounds: Normal heart sounds. Pulmonary:      Effort: Pulmonary effort is normal. No respiratory distress. Breath sounds: Normal breath sounds. Abdominal:      General: Bowel sounds are normal. There is no distension. Tenderness: There is no abdominal tenderness. Musculoskeletal:         General: Normal range of motion. Cervical back: Normal range of motion and neck supple. Skin:     General: Skin is warm and dry. Capillary Refill: Capillary refill takes less than 2 seconds. Neurological:      General: No focal deficit present. Mental Status: She is alert. Comments: Strength 5 out of 5 in all extremities, symmetric. Oriented x3. Cranial nerves II through XII intact. Procedures     MDM  54-year-old female presented to emergency department with severe headache. Initial concern for acute intracranial hemorrhage is also having some nausea with congestion. On initial lab work it turned out that she was COVID-positive. Her head CT showed no acute intracranial hemorrhage. Her chest x-ray was negative as well showing no pneumonia. No evidence for dehydration no leukocytosis. Her EKG did show some changes. No acute coronary syndrome patient's troponin was 8. She was having no chest pain here no shortness of breath. Patient was treated symptomatically and her headache had resolved. She is feeling much better and deemed stable for discharge home. COVID isolation precautions were given. She was recommended to follow-up with her primary care physician and cardiologist.  Return precautions discussed as well. EKG: This EKG is signed and interpreted by me. Rate: 78  Rhythm: Sinus  Interpretation: Peak T waves in lead I, aVL, deeper T wave inversion in lead III lead aVR compared to prior from 2020        ED Course as of 09/18/22 0209   Sat Sep 17, 2022   1592   ATTENDING PROVIDER ATTESTATION:     I have personally performed and/or participated in the history, exam, medical decision making, and procedures and agree with all pertinent clinical information unless otherwise noted. I have also reviewed and agree with the past medical, family and social history unless otherwise noted.     I have discussed this patient in detail with the resident and provided the instruction and education regarding the evidence-based evaluation and treatment of headache. Any EKG that may have been performed has been personally reviewed by me and I agree with the documentation as noted by the resident. History: patient presents with n/v and headache. The headache started after the vomiting. No photophobia. No fevers or chills. My findings: Alina Pat is a 79 y.o. female whom is in no distress. Physical exam reveals PERRL, EOMI, heart RRR, lungs CTA, abdomen is soft and nontender. No focal neurologic deficits. My plan: Symptomatic and supportive care. Patient found to have COVID. Will medicate for headache and evaluate with labs and CT. Electronically signed by Jeanmarie Dillard DO on 9/17/22 at 5:57 PM EDT       [JS]   1855 Currently waiting on urine sample. Patient still has headache. Will try Reglan and Benadryl. [FG]   2038 Nursing staff updated me that patient's urine sample was spilled in lab. I discussed with patient she is having no symptoms of dysuria or hematuria urinary frequency at this time or other concerning findings for urinary tract infection. Patient states that she is feeling better after migraine cocktail and would like to go home. She states if she develops urinary symptoms she will follow-up with her primary care physician. I think this is reasonable at this time. [FG]      ED Course User Index  [FG] Yohan Burgess DO  [JS] Jeanmarie Dillard DO       ED Course as of 09/18/22 1902   Sat Sep 17, 2022   6750   ATTENDING PROVIDER ATTESTATION:     I have personally performed and/or participated in the history, exam, medical decision making, and procedures and agree with all pertinent clinical information unless otherwise noted. I have also reviewed and agree with the past medical, family and social history unless otherwise noted.     I have discussed this patient in detail with the resident and provided the instruction and education regarding the evidence-based evaluation and treatment of headache. Any EKG that may have been performed has been personally reviewed by me and I agree with the documentation as noted by the resident. History: patient presents with n/v and headache. The headache started after the vomiting. No photophobia. No fevers or chills. My findings: Anderson Ledesma is a 79 y.o. female whom is in no distress. Physical exam reveals PERRL, EOMI, heart RRR, lungs CTA, abdomen is soft and nontender. No focal neurologic deficits. My plan: Symptomatic and supportive care. Patient found to have COVID. Will medicate for headache and evaluate with labs and CT. Electronically signed by Kay Romeo DO on 9/17/22 at 5:57 PM EDT       [JS]   7333 Currently waiting on urine sample. Patient still has headache. Will try Reglan and Benadryl. [FG]   2038 Nursing staff updated me that patient's urine sample was spilled in lab. I discussed with patient she is having no symptoms of dysuria or hematuria urinary frequency at this time or other concerning findings for urinary tract infection. Patient states that she is feeling better after migraine cocktail and would like to go home. She states if she develops urinary symptoms she will follow-up with her primary care physician. I think this is reasonable at this time. [FG]      ED Course User Index  [FG] Traci King DO  [JS] Kay Romeo DO       --------------------------------------------- PAST HISTORY ---------------------------------------------  Past Medical History:  has a past medical history of GERD (gastroesophageal reflux disease), Hypertension, and [facility] Past Surgical History:  has a past surgical history that includes Gastric bypass surgery; Hysterectomy; Rotator cuff repair; and Cardiac catheterization (02/24/2020).     Social History:  reports that she has never smoked. She has never used smokeless tobacco. She reports that she does not drink alcohol and does not use drugs. Family History: family history includes Heart Attack in her father. The patients home medications have been reviewed. Allergies:  Other    -------------------------------------------------- RESULTS -------------------------------------------------  Labs:  Results for orders placed or performed during the hospital encounter of 09/17/22   COVID-19, Rapid    Specimen: Nasopharyngeal Swab   Result Value Ref Range    SARS-CoV-2, NAAT DETECTED (A) Not Detected   CBC with Auto Differential   Result Value Ref Range    WBC 9.0 4.5 - 11.5 E9/L    RBC 4.45 3.50 - 5.50 E12/L    Hemoglobin 13.4 11.5 - 15.5 g/dL    Hematocrit 39.8 34.0 - 48.0 %    MCV 89.4 80.0 - 99.9 fL    MCH 30.1 26.0 - 35.0 pg    MCHC 33.7 32.0 - 34.5 %    RDW 13.3 11.5 - 15.0 fL    Platelets 733 947 - 601 E9/L    MPV 8.9 7.0 - 12.0 fL    Neutrophils % 80.6 (H) 43.0 - 80.0 %    Immature Granulocytes % 0.4 0.0 - 5.0 %    Lymphocytes % 9.2 (L) 20.0 - 42.0 %    Monocytes % 9.2 2.0 - 12.0 %    Eosinophils % 0.2 0.0 - 6.0 %    Basophils % 0.4 0.0 - 2.0 %    Neutrophils Absolute 7.25 1.80 - 7.30 E9/L    Immature Granulocytes # 0.04 E9/L    Lymphocytes Absolute 0.83 (L) 1.50 - 4.00 E9/L    Monocytes Absolute 0.83 0.10 - 0.95 E9/L    Eosinophils Absolute 0.02 (L) 0.05 - 0.50 E9/L    Basophils Absolute 0.04 0.00 - 0.20 E9/L   Comprehensive Metabolic Panel w/ Reflex to MG   Result Value Ref Range    Sodium 134 132 - 146 mmol/L    Potassium reflex Magnesium 4.1 3.5 - 5.0 mmol/L    Chloride 98 98 - 107 mmol/L    CO2 23 22 - 29 mmol/L    Anion Gap 13 7 - 16 mmol/L    Glucose 101 (H) 74 - 99 mg/dL    BUN 8 6 - 23 mg/dL    Creatinine 0.7 0.5 - 1.0 mg/dL    GFR Non-African American >60 >=60 mL/min/1.73    GFR African American >60     Calcium 9.0 8.6 - 10.2 mg/dL    Total Protein 7.0 6.4 - 8.3 g/dL    Albumin 4.1 3.5 - 5.2 g/dL Total Bilirubin 0.8 0.0 - 1.2 mg/dL    Alkaline Phosphatase 159 (H) 35 - 104 U/L    ALT 20 0 - 32 U/L    AST 34 (H) 0 - 31 U/L   Troponin   Result Value Ref Range    Troponin, High Sensitivity 8 0 - 9 ng/L   EKG 12 Lead   Result Value Ref Range    Ventricular Rate 78 BPM    Atrial Rate 78 BPM    P-R Interval 160 ms    QRS Duration 86 ms    Q-T Interval 384 ms    QTc Calculation (Bazett) 437 ms    P Axis 11 degrees    R Axis -16 degrees    T Axis -2 degrees       Radiology:  XR CHEST 1 VIEW   Final Result   No acute process. CT HEAD WO CONTRAST   Final Result   1. There is no acute intracranial abnormality. Specifically, there is no   intracranial hemorrhage. 2. Atrophy and periventricular leukomalacia,   .             ------------------------- NURSING NOTES AND VITALS REVIEWED ---------------------------  Date / Time Roomed:  9/17/2022  4:46 PM  ED Bed Assignment:  04/04    The nursing notes within the ED encounter and vital signs as below have been reviewed. BP (!) 156/97   Pulse 77   Temp 98.5 °F (36.9 °C) (Oral)   Resp 18   Ht 5' 2\" (1.575 m)   Wt 187 lb (84.8 kg)   SpO2 97%   BMI 34.20 kg/m²   Oxygen Saturation Interpretation: Normal      --------------------------------- ADDITIONAL PROVIDER NOTES ---------------------------------  At this time the patient is without objective evidence of an acute process requiring hospitalization or inpatient management. They have remained hemodynamically stable throughout their entire ED visit and are stable for discharge with outpatient follow-up. The plan has been discussed in detail and they are aware of the specific conditions for emergent return, as well as the importance of follow-up.       Discharge Medication List as of 9/17/2022  8:50 PM        START taking these medications    Details   fluticasone (FLONASE) 50 MCG/ACT nasal spray 1 spray by Each Nostril route daily, Disp-16 g, R-0Normal      ondansetron (ZOFRAN-ODT) 4 MG disintegrating tablet Take 1 tablet by mouth 3 times daily as needed for Nausea or Vomiting, Disp-10 tablet, R-0Normal             Diagnosis:  1. COVID-19        Disposition:  Patient's disposition: Discharge to home  Patient's condition is stable.        Eliazar Woods, DO  Resident  09/18/22 6967 left upper quadrant/right upper quadrant

## 2022-10-03 ENCOUNTER — NON-APPOINTMENT (OUTPATIENT)
Age: 62
End: 2022-10-03

## 2022-10-20 ENCOUNTER — APPOINTMENT (OUTPATIENT)
Dept: OBGYN | Facility: CLINIC | Age: 62
End: 2022-10-20
Payer: COMMERCIAL

## 2022-10-20 VITALS
DIASTOLIC BLOOD PRESSURE: 80 MMHG | BODY MASS INDEX: 39.27 KG/M2 | WEIGHT: 230 LBS | SYSTOLIC BLOOD PRESSURE: 130 MMHG | HEIGHT: 64 IN

## 2022-10-20 DIAGNOSIS — Z30.432 ENCOUNTER FOR REMOVAL OF INTRAUTERINE CONTRACEPTIVE DEVICE: ICD-10-CM

## 2022-10-20 PROCEDURE — 99396 PREV VISIT EST AGE 40-64: CPT | Mod: 25

## 2022-10-20 PROCEDURE — 58301 REMOVE INTRAUTERINE DEVICE: CPT | Mod: 59

## 2022-10-20 RX ORDER — NIRMATRELVIR AND RITONAVIR 300-100 MG
20 X 150 MG & KIT ORAL
Qty: 30 | Refills: 0 | Status: COMPLETED | COMMUNITY
Start: 2022-10-09

## 2022-10-20 NOTE — HISTORY OF PRESENT ILLNESS
[TextBox_4] : 63yo presents for annual exam\par pt with h/o endometrial hyperplasia, last bx benign - can remove mirena today \par recently had covid  [Mammogramdate] : 2021 [PapSmeardate] : 2021 [BoneDensityDate] : 2020 [ColonoscopyDate] : 7/2020 [TextBox_43] : g

## 2022-10-20 NOTE — PROCEDURE
[IUD Removal] : intrauterine device (IUD) removal [Speculum Placed] : speculum placed [IUD Removed - Forceps] : IUD removed - forceps [Cultured] : specimen sent for cultures [Tolerated Well] : Patient tolerated the procedure well [No Complications] : no complications

## 2022-10-25 LAB
CYTOLOGY CVX/VAG DOC THIN PREP: NORMAL
HPV HIGH+LOW RISK DNA PNL CVX: NOT DETECTED

## 2022-10-27 LAB — ACTINOMYCES CULTURE FOR IUD: NORMAL

## 2022-11-20 ENCOUNTER — NON-APPOINTMENT (OUTPATIENT)
Age: 62
End: 2022-11-20

## 2022-12-08 ENCOUNTER — NON-APPOINTMENT (OUTPATIENT)
Age: 62
End: 2022-12-08

## 2022-12-14 ENCOUNTER — NON-APPOINTMENT (OUTPATIENT)
Age: 62
End: 2022-12-14

## 2023-02-16 ENCOUNTER — APPOINTMENT (OUTPATIENT)
Dept: ENDOCRINOLOGY | Facility: CLINIC | Age: 63
End: 2023-02-16
Payer: COMMERCIAL

## 2023-02-16 VITALS
SYSTOLIC BLOOD PRESSURE: 154 MMHG | BODY MASS INDEX: 39.27 KG/M2 | OXYGEN SATURATION: 98 % | WEIGHT: 230 LBS | DIASTOLIC BLOOD PRESSURE: 85 MMHG | HEIGHT: 64 IN | HEART RATE: 81 BPM

## 2023-02-16 DIAGNOSIS — D35.2 BENIGN NEOPLASM OF PITUITARY GLAND: ICD-10-CM

## 2023-02-16 DIAGNOSIS — E22.1 HYPERPROLACTINEMIA: ICD-10-CM

## 2023-02-16 DIAGNOSIS — E83.52 HYPERCALCEMIA: ICD-10-CM

## 2023-02-16 PROCEDURE — 99203 OFFICE O/P NEW LOW 30 MIN: CPT

## 2023-03-03 NOTE — HISTORY OF PRESENT ILLNESS
[FreeTextEntry1] : PORFIRIO AUGUSTE  is a 62 year old female with past medical history of hyperprolactinemia, hyperparathyroidism and osteopenia who presents today for management of hyperprolactinemia \par \par She has h/o normocalcemic hyperparathyroidism. She has h/o hyperprolactinemia, she was on cabergoline previously and stopped in 2009 however prolactin shaq to 150. She then resumed therapy and then stopped in Feb 2014. At last endo in Dec 2021, she was off therapy and noted prolactin was slightly elevated at 68 but patient was asymptomatic. MRI in 2020 noted [id number] pedunculated adenoma. \par She sees ophthalmology regularly, normal visual field tests in past.\par LMP was in June 2013. She is taking calcium and vitamin D for osteopenia, mother has h/o OTP. \par \par Today denies blurry vision, headaches, galactorrhea. \par Patient denies heat or cold intolerance, dyspnea, dysphagia, dysphonia, changes in bowel habits including diarrhea or constipation or any recent change in weight.

## 2023-03-03 NOTE — ASSESSMENT
Chief Complaint   Patient presents with   â¢ Office Visit   â¢ Annual Exam   â¢ Referral     mammogram         SUBJECTIVE:   HISTORY OF PRESENT ILLNESS:  Manuel Gutierrez is a 50year old female who presents today for an annual examination. She has been experiencing bilateral upper back pain for the past couple weeks. Pain is constant. She will intermittently have numbness and weakness of the bilateral upper extremities. She is not able to further delineate where she is experiencing these symptoms. She would like a referral for a mammogram.     Her daughter reports that she snores a lot. She works as a  and also at [employer]. She had a hysterectomy years ago due to fibroids. PAST MEDICAL HISTORY:  No past medical history on file. PAST SURGICAL HISTORY:  Past Surgical History:   Procedure Laterality Date   â¢ Hysterectomy  2016    for fibroids, right ovary removed- no mention of left ovary in path report   â¢ Nail avulsion/excision      right toe   â¢ Vaginoplasty         FAMILY HISTORY:  Family History   Problem Relation Age of Onset   â¢ Patient is unaware of any medical problems Mother    â¢ Stroke Father        SOCIAL HISTORY:  Social History     Tobacco Use   â¢ Smoking status: Never Smoker   â¢ Smokeless tobacco: Never Used   Substance Use Topics   â¢ Alcohol use: Never   â¢ Drug use: Never       ALLERGIES:  ALLERGIES:  No Known Allergies    MEDICATIONS:  Current Outpatient Medications   Medication Sig Dispense Refill   â¢ docosanol (ABREVA) 10 % Cream cream Apply topically 5 times daily as needed (ulceras de la boca (cold sores)). Aplica deepika veces cada china por brigitte resendiz cuando tiene las [place] de la boca. 2 g 1   â¢ fluticasone (FLONASE) 50 MCG/ACT nasal spray Spray 2 sprays in each nostril daily. 16 g 3     No current facility-administered medications for this visit.          OBJECTIVE:     Visit Vitals  /65 (BP Location: LUE - Left upper extremity, Patient [FreeTextEntry1] : Patient presents for evaluation hyperprolactinemia, pituitary microadenoma and osteopenia.\par Patient has history of normocalcemic hyperparathyroidism.  Not currently on calcium supplementation at this time\par Clinically asymptomatic\par She was previously on cabergoline for hyperprolactinemia however has been off medication since December 2021.  She denies galactorrhea, headaches or blurry/double vision.\par She has seen ophthalmology regularly, patient reports normal visual field tests\par \par h/o osteopenia\par Advised to continue vitamin D supplementation and to do light weightbearing exercises, as tolerated\par Reviewed bone density exam from November 2022 with patient today and noted to have osteopenia.\par Given prescription today to do fasting blood work to check calcium and vitamin D levels as well is pituitary hormonal work-up.\par \par Answered all questions today; patient verbalized understanding of the above\par RTC in 6 months\par  "Position: Sitting, Cuff Size: Regular)   Pulse 63   Temp 97.2 Â°F (36.2 Â°C) (Temporal)   Resp 16   Ht 4' 8.5"" (1.435 m)   Wt 72.7 kg (160 lb 6.2 oz)   SpO2 97%   BMI 35.33 kg/mÂ²       Physical Exam  Vitals reviewed. Constitutional:       General: She is not in acute distress. Appearance: Normal appearance. She is not ill-appearing or toxic-appearing. HENT:      Head: Normocephalic and atraumatic. Right Ear: Tympanic membrane, ear canal and external ear normal.      Left Ear: Tympanic membrane, ear canal and external ear normal.      Nose: Nose normal. No congestion or rhinorrhea. Mouth/Throat:      Mouth: Mucous membranes are moist.      Pharynx: No oropharyngeal exudate or posterior oropharyngeal erythema. Neck: Normal range of motion and neck supple. No tenderness. Eyes:      General:         Right eye: No discharge. Left eye: No discharge. Conjunctiva/sclera: Conjunctivae normal.   Cardiovascular:      Rate and Rhythm: Normal rate and regular rhythm. Heart sounds: No murmur heard. Pulmonary:      Effort: Pulmonary effort is normal. No respiratory distress. Breath sounds: Normal breath sounds. No wheezing, rhonchi or rales. Abdominal:      General: There is no distension. Palpations: Abdomen is soft. Tenderness: There is no abdominal tenderness. There is no guarding or rebound. Musculoskeletal:         General: No deformity. Normal range of motion. Comments: Hypertonicity of the trapezius muscle bilaterally. Lymphadenopathy:      Cervical: No cervical adenopathy. Skin:     General: Skin is warm. Findings: No rash. Neurological:      General: No focal deficit present. Mental Status: She is alert. Comments: 5/5  strength bilaterally. 5/5 flexion/extension bilaterally of the upper extremities.    Psychiatric:      Comments: Cooperative       Last four GAD7 Assessments       GAD7 2/12/2022   GAD7 Score 8   Feeling nervous, " anxious or on edge More than half the days   Not being able to stop or control worrying Several days   Worrying too much about different things Several days   Trouble relaxing Several days   Being so restless that it's hard to sit still Several days   Becoming easily annoyed or irritable Several days   Feeling afraid as if something awful might happen Several days           DIAGNOSTIC STUDIES:   LAB RESULTS:    No results found for this visit on 02/12/22. Assessment AND PLAN:     Problem List Items Addressed This Visit     None      Visit Diagnoses     Well adult exam    -  Primary    Class 2 obesity due to excess calories without serious comorbidity with body mass index (BMI) of 35.0 to 35.9 in adult        Relevant Orders    THYROID STIMULATING HORMONE    LIPID PANEL WITH REFLEX    GLYCOHEMOGLOBIN    COMPREHENSIVE METABOLIC PANEL    Encounter for screening mammogram for malignant neoplasm of breast        Relevant Orders    MAMMO SCREENING BILATERAL    Colon cancer screening        Relevant Orders    OPEN ACCESS COLONOSCOPY    Prediabetes        Relevant Orders    GLYCOHEMOGLOBIN    Snoring        Relevant Orders    SERVICE TO ENT    Transaminitis        Relevant Orders    HEPATITIS PANEL CHRONIC WITH REFLEX    Need for hepatitis C screening test        Relevant Orders    HEPATITIS PANEL CHRONIC WITH REFLEX    Acute bilateral thoracic back pain            Well adult exam:  Vaccinations up to date. S/p hysterectomy - no indication for further pap testing. Screening mammogram for routine breast cancer screening ordered. Hepatitis C screening ordered. Elevated BMI:  Prediabetes:  Discussed diet and lifestyle modifications to help lose weight. Will obtain lab testing with A1C, CMP, TSH and lipid panel. Snoring:  Referral to ENT placed to have sleep study performed. Transaminitis:  Noted on previous labs.   Will obtain CMP as well as chronic hepatitis panel since already performing testing for hepatitis C screen. Acute back pain:  Suspect musculoskeletal etiology of pain as she has significant hypertonicity of the trapezius muscle which can also help explain intermittent symptoms of numbness/tingling of the upper extremities. Home exercises provided. Precautions to return to clinic reviewed with patient should her symptoms persist despite performing above exercises or develops persistent numbness/tingling/weakness of the upper extremities. Anxiety:  Discussed finding a counselor via [place]  Return to clinic if symptoms worsen and will consider beginning pharmacotherapy at that time. Return in about 2 months (around 4/12/2022) for Follow up. Instructions provided as documented in the AVS.    The patient indicated understanding of the diagnosis, agreed with the plan of care and agreed to call or return with any new or worsening symptoms.     Cristi Elliott, DO  2/12/2022

## 2023-04-17 DIAGNOSIS — E21.0 PRIMARY HYPERPARATHYROIDISM: ICD-10-CM

## 2023-04-17 LAB
25(OH)D3 SERPL-MCNC: 30 NG/ML
ALBUMIN SERPL ELPH-MCNC: 4.1 G/DL
ALP BLD-CCNC: 132 U/L
ALT SERPL-CCNC: 18 U/L
ANION GAP SERPL CALC-SCNC: 12 MMOL/L
AST SERPL-CCNC: 16 U/L
BASOPHILS # BLD AUTO: 0.11 K/UL
BASOPHILS NFR BLD AUTO: 1.2 %
BILIRUB SERPL-MCNC: 0.6 MG/DL
BUN SERPL-MCNC: 11 MG/DL
CALCIUM SERPL-MCNC: 10 MG/DL
CALCIUM SERPL-MCNC: 10 MG/DL
CHLORIDE SERPL-SCNC: 107 MMOL/L
CHOLEST SERPL-MCNC: 197 MG/DL
CO2 SERPL-SCNC: 21 MMOL/L
CREAT SERPL-MCNC: 0.88 MG/DL
EGFR: 74 ML/MIN/1.73M2
EOSINOPHIL # BLD AUTO: 0.21 K/UL
EOSINOPHIL NFR BLD AUTO: 2.2 %
ESTIMATED AVERAGE GLUCOSE: 128 MG/DL
FSH SERPL-MCNC: 18.3 IU/L
GLUCOSE SERPL-MCNC: 115 MG/DL
HBA1C MFR BLD HPLC: 6.1 %
HCT VFR BLD CALC: 39.7 %
HDLC SERPL-MCNC: 46 MG/DL
HGB BLD-MCNC: 12.9 G/DL
IMM GRANULOCYTES NFR BLD AUTO: 0.4 %
LDLC SERPL CALC-MCNC: 121 MG/DL
LH SERPL-ACNC: 12.7 IU/L
LYMPHOCYTES # BLD AUTO: 2.49 K/UL
LYMPHOCYTES NFR BLD AUTO: 26.6 %
MAN DIFF?: NORMAL
MCHC RBC-ENTMCNC: 26 PG
MCHC RBC-ENTMCNC: 32.5 GM/DL
MCV RBC AUTO: 80 FL
MONOCYTES # BLD AUTO: 0.52 K/UL
MONOCYTES NFR BLD AUTO: 5.6 %
NEUTROPHILS # BLD AUTO: 5.99 K/UL
NEUTROPHILS NFR BLD AUTO: 64 %
NONHDLC SERPL-MCNC: 150 MG/DL
PARATHYROID HORMONE INTACT: 120 PG/ML
PLATELET # BLD AUTO: 383 K/UL
POTASSIUM SERPL-SCNC: 4.4 MMOL/L
PROLACTIN SERPL-MCNC: 61.6 NG/ML
PROT SERPL-MCNC: 7 G/DL
RBC # BLD: 4.96 M/UL
RBC # FLD: 14.9 %
SODIUM SERPL-SCNC: 140 MMOL/L
T4 FREE SERPL-MCNC: 0.8 NG/DL
TRIGL SERPL-MCNC: 146 MG/DL
TSH SERPL-ACNC: 2.54 UIU/ML
WBC # FLD AUTO: 9.36 K/UL

## 2023-04-18 ENCOUNTER — NON-APPOINTMENT (OUTPATIENT)
Age: 63
End: 2023-04-18

## 2023-11-09 ENCOUNTER — APPOINTMENT (OUTPATIENT)
Dept: OBGYN | Facility: CLINIC | Age: 63
End: 2023-11-09
Payer: COMMERCIAL

## 2023-11-09 VITALS
SYSTOLIC BLOOD PRESSURE: 168 MMHG | BODY MASS INDEX: 39.27 KG/M2 | DIASTOLIC BLOOD PRESSURE: 91 MMHG | WEIGHT: 230 LBS | HEIGHT: 64 IN

## 2023-11-09 DIAGNOSIS — Z01.419 ENCOUNTER FOR GYNECOLOGICAL EXAMINATION (GENERAL) (ROUTINE) W/OUT ABNORMAL FINDINGS: ICD-10-CM

## 2023-11-09 PROCEDURE — 99396 PREV VISIT EST AGE 40-64: CPT

## 2023-11-09 RX ORDER — PSYLLIUM HUSK 0.4 G
CAPSULE ORAL
Refills: 0 | Status: ACTIVE | COMMUNITY

## 2023-11-10 LAB — HPV HIGH+LOW RISK DNA PNL CVX: NOT DETECTED

## 2023-11-13 LAB — CYTOLOGY CVX/VAG DOC THIN PREP: NORMAL

## 2024-01-02 ENCOUNTER — NON-APPOINTMENT (OUTPATIENT)
Age: 64
End: 2024-01-02

## 2024-01-09 ENCOUNTER — NON-APPOINTMENT (OUTPATIENT)
Age: 64
End: 2024-01-09

## 2024-01-09 ENCOUNTER — APPOINTMENT (OUTPATIENT)
Dept: ORTHOPEDIC SURGERY | Facility: CLINIC | Age: 64
End: 2024-01-09
Payer: COMMERCIAL

## 2024-01-09 DIAGNOSIS — S89.92XA UNSPECIFIED INJURY OF LEFT LOWER LEG, INITIAL ENCOUNTER: ICD-10-CM

## 2024-01-09 PROCEDURE — 20610 DRAIN/INJ JOINT/BURSA W/O US: CPT | Mod: LT

## 2024-01-09 PROCEDURE — 99204 OFFICE O/P NEW MOD 45 MIN: CPT | Mod: 25

## 2024-01-09 PROCEDURE — 73564 X-RAY EXAM KNEE 4 OR MORE: CPT | Mod: LT

## 2024-01-09 RX ORDER — MELOXICAM 15 MG/1
15 TABLET ORAL
Qty: 30 | Refills: 1 | Status: ACTIVE | COMMUNITY
Start: 2024-01-09 | End: 1900-01-01

## 2024-05-30 NOTE — ANESTHESIA FOLLOW-UP NOTE - NSRECOMMEND_GEN_ALL_CORE
Date of Office Visit: 2024  Encounter Provider: JAD Mims  Place of Service: [facility] CARDIOLOGY  Patient Name: Arleth Angeles  :1974    Chief Complaint   Patient presents with    Hospital Follow Up Visit   : chest pain     HPI: Arleth Angeles is a 49 y.o. female who is a patient of  Dr. Lux and is new to me today.  On May 20th patient presented to the [facility] ER with left-sided chest pain on and off for about 2 months.  It was described as an intermittent pressure in the left shoulder of moderate intensity.  EKG had some nonspecific T wave abnormalities troponin was normal.  Patient also had a D-dimer that was normal.  Underwent echocardiogram showed no abnormalities.  EKG treadmill stress test was performed only exercised for 4-1/2 minutes achieving 6 METs without angina or EKG changes.  Etiology of chest pain remains unclear and we recommended a Zio patch due to episodic palpitations.  Previous testing and notes have been reviewed by me.     She comes in today she is still having some occasional chest discomfort.  Described as a left-sided squeezing can lasted briefly or last for several minutes.  Her father has a history of high blood pressure but otherwise no other family history.  History reviewed. No pertinent past medical history.    History reviewed. No pertinent surgical history.    Social History     Socioeconomic History    Marital status:    Tobacco Use    Smoking status: Never    Smokeless tobacco: Never   Vaping Use    Vaping status: Never Used   Substance and Sexual Activity    Alcohol use: Never    Drug use: Never    Sexual activity: Defer       History reviewed. No pertinent family history.    Review of Systems   Constitutional: Negative for diaphoresis and malaise/fatigue.   Cardiovascular:  Positive for chest pain. Negative for claudication, dyspnea on exertion, irregular heartbeat, leg swelling, near-syncope, orthopnea,  "palpitations, paroxysmal nocturnal dyspnea and syncope.   Respiratory:  Negative for cough, shortness of breath and sleep disturbances due to breathing.    Musculoskeletal:  Negative for falls.   Neurological:  Negative for dizziness and weakness.   Psychiatric/Behavioral:  Negative for altered mental status and substance abuse.        Allergies   Allergen Reactions    Pork-Derived Products Unknown - High Severity     No pork per patient request for cultural beliefs         Current Outpatient Medications:     hydroCHLOROthiazide 12.5 MG tablet, Take 1 tablet by mouth Daily., Disp: , Rfl:     Omega-3 Fatty Acids (fish oil) 1000 MG capsule capsule, Take 2 capsules by mouth Daily With Breakfast., Disp: , Rfl:     vitamin D (ERGOCALCIFEROL) 1.25 MG (60012 UT) capsule capsule, Take 1 capsule by mouth 1 (One) Time Per Week., Disp: , Rfl:       Objective:     Vitals:    05/30/24 0956   BP: 124/77   BP Location: Left arm   Patient Position: Sitting   Cuff Size: Adult   Pulse: 90   SpO2: 98%   Weight: 74.8 kg (165 lb)   Height: 167.6 cm (66\")     Body mass index is 26.63 kg/m².    PHYSICAL EXAM:    Constitutional:       General: Not in acute distress.     Appearance: Normal appearance. Well-developed.   Eyes:      Pupils: Pupils are equal, round, and reactive to light.   HENT:      Head: Normocephalic.   Neck:      Vascular: No carotid bruit or JVD.   Pulmonary:      Effort: Pulmonary effort is normal. No tachypnea.      Breath sounds: Normal breath sounds. No wheezing. No rales.   Cardiovascular:      Normal rate. Regular rhythm.      No gallop.    Pulses:     Intact distal pulses.   Edema:     Peripheral edema absent.   Abdominal:      General: Bowel sounds are normal.      Palpations: Abdomen is soft.      Tenderness: There is no abdominal tenderness.   Musculoskeletal: Normal range of motion.      Cervical back: Normal range of motion and neck supple. No edema. Skin:     General: Skin is warm and dry.   Neurological:     "  Mental Status: Alert and oriented to person, place, and time.           ECG 12 Lead    Date/Time: 5/30/2024 10:48 AM  Performed by: Shabnam Pimentel APRN    Authorized by: Shabnam Pimentel APRN  Comparison: compared with previous ECG from 5/20/2024  Similar to previous ECG  Rhythm: sinus rhythm  Rate: normal  QRS axis: normal  Other findings: non-specific ST-T wave changes    Clinical impression: non-specific ECG            Assessment:       Diagnosis Plan   1. Chest pain continues with chest pain with previously abnormal EKG.  Will repeat stress test with nuclear imaging. Stress Test With Myocardial Perfusion One Day        Orders Placed This Encounter   Procedures    Stress Test With Myocardial Perfusion One Day     Standing Status:   Future     Standing Expiration Date:   5/30/2025     Order Specific Question:   What stress agent will be used?     Answer:   Exercise with possible pharmacologic     Order Specific Question:   Reason for exam?     Answer:   Chest Pain     Order Specific Question:   Release to patient     Answer:   Routine Release [[id number]]          Plan:       Follow-up with Dr. Lux in a month.          Your medication list            Accurate as of May 30, 2024 10:41 AM. If you have any questions, ask your nurse or doctor.                CONTINUE taking these medications        Instructions Last Dose Given Next Dose Due   fish oil 1000 MG capsule capsule      Take 2 capsules by mouth Daily With Breakfast.       hydroCHLOROthiazide 12.5 MG tablet      Take 1 tablet by mouth Daily.       vitamin D 1.25 MG (43547 UT) capsule capsule  Commonly known as: ERGOCALCIFEROL      Take 1 capsule by mouth 1 (One) Time Per Week.                  As always, it has been a pleasure to participate in your patient's care.      Sincerely,     Shabnam STREET   None

## 2024-12-06 ENCOUNTER — APPOINTMENT (OUTPATIENT)
Dept: OBGYN | Facility: CLINIC | Age: 64
End: 2024-12-06
Payer: COMMERCIAL

## 2024-12-06 ENCOUNTER — NON-APPOINTMENT (OUTPATIENT)
Age: 64
End: 2024-12-06

## 2024-12-06 VITALS
DIASTOLIC BLOOD PRESSURE: 91 MMHG | BODY MASS INDEX: 36.54 KG/M2 | HEIGHT: 64 IN | SYSTOLIC BLOOD PRESSURE: 163 MMHG | WEIGHT: 214 LBS

## 2024-12-06 DIAGNOSIS — Z01.419 ENCOUNTER FOR GYNECOLOGICAL EXAMINATION (GENERAL) (ROUTINE) W/OUT ABNORMAL FINDINGS: ICD-10-CM

## 2024-12-06 PROCEDURE — 99459 PELVIC EXAMINATION: CPT

## 2024-12-06 PROCEDURE — 99396 PREV VISIT EST AGE 40-64: CPT

## 2024-12-12 LAB
CYTOLOGY CVX/VAG DOC THIN PREP: NORMAL
HPV HIGH+LOW RISK DNA PNL CVX: NOT DETECTED

## 2025-01-06 ENCOUNTER — NON-APPOINTMENT (OUTPATIENT)
Age: 65
End: 2025-01-06

## 2025-01-17 ENCOUNTER — ASOB RESULT (OUTPATIENT)
Age: 65
End: 2025-01-17

## 2025-01-17 ENCOUNTER — APPOINTMENT (OUTPATIENT)
Dept: OBGYN | Facility: CLINIC | Age: 65
End: 2025-01-17

## 2025-01-17 PROCEDURE — 76830 TRANSVAGINAL US NON-OB: CPT

## 2025-01-21 DIAGNOSIS — N64.89 OTHER SPECIFIED DISORDERS OF BREAST: ICD-10-CM

## 2025-07-14 ENCOUNTER — NON-APPOINTMENT (OUTPATIENT)
Age: 65
End: 2025-07-14